# Patient Record
Sex: FEMALE | Race: WHITE | Employment: FULL TIME | URBAN - METROPOLITAN AREA
[De-identification: names, ages, dates, MRNs, and addresses within clinical notes are randomized per-mention and may not be internally consistent; named-entity substitution may affect disease eponyms.]

---

## 2017-01-17 ENCOUNTER — OFFICE VISIT (OUTPATIENT)
Dept: FAMILY MEDICINE | Facility: CLINIC | Age: 27
End: 2017-01-17
Payer: COMMERCIAL

## 2017-01-17 VITALS
DIASTOLIC BLOOD PRESSURE: 70 MMHG | BODY MASS INDEX: 27.82 KG/M2 | HEART RATE: 69 BPM | SYSTOLIC BLOOD PRESSURE: 115 MMHG | HEIGHT: 63 IN | TEMPERATURE: 97.6 F | OXYGEN SATURATION: 98 % | WEIGHT: 157 LBS

## 2017-01-17 DIAGNOSIS — F41.9 ANXIETY: Primary | ICD-10-CM

## 2017-01-17 PROCEDURE — 99203 OFFICE O/P NEW LOW 30 MIN: CPT | Performed by: PHYSICIAN ASSISTANT

## 2017-01-17 RX ORDER — CITALOPRAM HYDROBROMIDE 40 MG/1
40 TABLET ORAL
COMMUNITY
Start: 2016-12-06 | End: 2017-06-21

## 2017-01-17 RX ORDER — CITALOPRAM HYDROBROMIDE 20 MG/1
20 TABLET ORAL DAILY
Qty: 90 TABLET | Refills: 1 | Status: SHIPPED | OUTPATIENT
Start: 2017-01-17 | End: 2017-06-21

## 2017-01-17 ASSESSMENT — ANXIETY QUESTIONNAIRES
7. FEELING AFRAID AS IF SOMETHING AWFUL MIGHT HAPPEN: NOT AT ALL
3. WORRYING TOO MUCH ABOUT DIFFERENT THINGS: NEARLY EVERY DAY
GAD7 TOTAL SCORE: 8
IF YOU CHECKED OFF ANY PROBLEMS ON THIS QUESTIONNAIRE, HOW DIFFICULT HAVE THESE PROBLEMS MADE IT FOR YOU TO DO YOUR WORK, TAKE CARE OF THINGS AT HOME, OR GET ALONG WITH OTHER PEOPLE: SOMEWHAT DIFFICULT
6. BECOMING EASILY ANNOYED OR IRRITABLE: MORE THAN HALF THE DAYS
5. BEING SO RESTLESS THAT IT IS HARD TO SIT STILL: NOT AT ALL
1. FEELING NERVOUS, ANXIOUS, OR ON EDGE: NEARLY EVERY DAY
2. NOT BEING ABLE TO STOP OR CONTROL WORRYING: NOT AT ALL

## 2017-01-17 ASSESSMENT — PATIENT HEALTH QUESTIONNAIRE - PHQ9: 5. POOR APPETITE OR OVEREATING: NOT AT ALL

## 2017-01-17 NOTE — Clinical Note
Please abstract the following data from this visit with this patient into the appropriate field in Epic:  Pap smear done on this date: 5-, results were normal.

## 2017-01-17 NOTE — MR AVS SNAPSHOT
After Visit Summary   1/17/2017    Charis Kruger    MRN: [id number]           Patient Information     Date Of Birth          1990        Visit Information        Provider Department      1/17/2017 5:40 PM Brenda Larry PA-C [facility]        Today's Diagnoses     Anxiety    -  1       Care Instructions    Recheck 6 months, sooner if needed  Schedule with therapy        Follow-ups after your visit        Additional Services     MENTAL HEALTH REFERRAL       Your provider has referred you to: [facility]: [facility] - Counseling (Individual/Couples/Family) - [facility] [phone]   [email or web address]/   *Patient will be contacted by [facility] scheduling partner[facility]), to schedule an appointment.  Patients may also call [facility] to schedule.    All scheduling is subject to the client's specific insurance plan & benefits, provider/location availability, and provider clinical specialities.  Please arrive 15 minutes early for your first appointment and bring your completed paperwork.    Please be aware that coverage of these services is subject to the terms and limitations of your health insurance plan.  Call member services at your health plan with any benefit or coverage questions.                  Who to contact     If you have questions or need follow up information about today's clinic visit or your schedule please contact [facility] directly at [phone].  Normal or non-critical lab and imaging results will be communicated to you by MyChart, letter or phone within 4 business days after the clinic has received the results. If you do not hear from us within 7 days, please contact the clinic through MyChart or phone. If you have a critical or abnormal lab result, we will notify you by phone as soon as possible.  Submit refill requests through  "Tracee or call your pharmacy and they will forward the refill request to us. Please allow 3 business days for your refill to be completed.          Additional Information About Your Visit        MyChart Information     NCRhart lets you send messages to your doctor, view your test results, renew your prescriptions, schedule appointments and more. To sign up, go to [email or web address] . Click on \"Log in\" on the left side of the screen, which will take you to the Welcome page. Then click on \"Sign up Now\" on the right side of the page.     You will be asked to enter the access code listed below, as well as some personal information. Please follow the directions to create your username and password.     Your access code is: [email or web address]  Expires: 2017  6:18 PM     Your access code will  in 90 days. If you need help or a new code, please call your [place] clinic or [phone].        Care EveryWhere ID     This is your Care EveryWhere ID. This could be used by other organizations to access your [place] medical records  [id number]        Your Vitals Were     Pulse Temperature Height BMI (Body Mass Index) Pulse Oximetry       69 97.6  F (36.4  C) (Oral) 5' 3\" (1.6 m) 27.82 kg/m2 98%        Blood Pressure from Last 3 Encounters:   17 115/70    Weight from Last 3 Encounters:   17 157 lb (71.215 kg)              We Performed the Following     MENTAL HEALTH REFERRAL          Today's Medication Changes          These changes are accurate as of: 17  6:18 PM.  If you have any questions, ask your nurse or doctor.               These medicines have changed or have updated prescriptions.        Dose/Directions    * celeXA 40 MG tablet   This may have changed:  Another medication with the same name was added. Make sure you understand how and when to take each.   Generic drug:  citalopram   Changed by:  Brenda Larry PA-C        Dose:  40 mg   Take 40 mg by mouth   Refills:  0       * " citalopram 20 MG tablet   Commonly known as:  celeXA   This may have changed:  You were already taking a medication with the same name, and this prescription was added. Make sure you understand how and when to take each.   Used for:  Anxiety   Changed by:  Brenda Larry PA-C        Dose:  20 mg   Take 1 tablet (20 mg) by mouth daily Note decrease in dose   Quantity:  90 tablet   Refills:  1       * Notice:  This list has 2 medication(s) that are the same as other medications prescribed for you. Read the directions carefully, and ask your doctor or other care provider to review them with you.      Stop taking these medicines if you haven't already. Please contact your care team if you have questions.     CITALOPRAM & DIET MANAGE PROD PO   Stopped by:  Brenda Larry PA-C                Where to get your medicines      These medications were sent to [facility] [id number] [place] [address] AT [address] [address]     Phone:  [phone]    - citalopram 20 MG tablet             Primary Care Provider    None Specified       No primary provider on file.        Thank you!     Thank you for choosing [facility]  for your care. Our goal is always to provide you with excellent care. Hearing back from our patients is one way we can continue to improve our services. Please take a few minutes to complete the written survey that you may receive in the mail after your visit with us. Thank you!             Your Updated Medication List - Protect others around you: Learn how to safely use, store and throw away your medicines at www.disposemymeds.org.          This list is accurate as of: 1/17/17  6:18 PM.  Always use your most recent med list.                   Brand Name Dispense Instructions for use    * celeXA 40 MG tablet   Generic drug:  citalopram      Take 40 mg by mouth       * citalopram 20 MG tablet     celeXA    90 tablet    Take 1 tablet (20 mg) by mouth daily Note decrease in dose       * Notice:  This list has 2 medication(s) that are the same as other medications prescribed for you. Read the directions carefully, and ask your doctor or other care provider to review them with you.

## 2017-01-17 NOTE — PROGRESS NOTES
"  SUBJECTIVE:                                                    Charis Kruger is a 26 year old female who presents to clinic today for the following health issues:        Medication Followup of Citalopram 40 mg    Taking Medication as prescribed: yes    Side Effects:  None    Medication Helping Symptoms:  yes     Increased to 40 a few months ago, feels like it is too much feels \"numb\".    Has been on celexa for years. Would like to try weaning off.  No depression. No insomnia.    Has never done therapy.   Normal pap on 5-6-14. Will abstract. Through [facility].   Denies suicidal or homicidal thoughts.  Patient instructed to go to the emergency room or call 911 if these occur.    .  Not currently using birth control.  Not preventing pregnancy.      Working currently.  Works in [employer].     No previous pregnancies.           Problem list and histories reviewed & adjusted, as indicated.  Additional history: as documented    Patient Active Problem List   Diagnosis     Anxiety     Past Surgical History   Procedure Laterality Date     Back surgery  2008     tailbone surgery       Social History   Substance Use Topics     Smoking status: Never Smoker      Smokeless tobacco: Not on file     Alcohol Use: Yes      Comment: socially     No family history on file.      Current Outpatient Prescriptions   Medication Sig Dispense Refill     citalopram (CELEXA) 40 MG tablet Take 40 mg by mouth       citalopram (CELEXA) 20 MG tablet Take 1 tablet (20 mg) by mouth daily Note decrease in dose 90 tablet 1     No Known Allergies    ROS:  Constitutional, HEENT, cardiovascular, pulmonary, gi and gu systems are negative, except as otherwise noted.    OBJECTIVE:                                                    /70 mmHg  Pulse 69  Temp(Src) 97.6  F (36.4  C) (Oral)  Ht 5' 3\" (1.6 m)  Wt 157 lb (71.215 kg)  BMI 27.82 kg/m2  SpO2 98%  Body mass index is 27.82 kg/(m^2).  GENERAL: healthy, alert and no distress  RESP: lungs " clear to auscultation - no rales, rhonchi or wheezes  CV: regular rate and rhythm, normal S1 S2, no S3 or S4, no murmur, click or rub, no peripheral edema and peripheral pulses strong  MS: no gross musculoskeletal defects noted, no edema  NEURO: Normal strength and tone, mentation intact and speech normal  PSYCH: mentation appears normal, affect normal/bright         ASSESSMENT/PLAN:                                                    ASSESSMENT / PLAN:  (F41.9) Anxiety  (primary encounter diagnosis)  Comment:  Doing well but having some side effects, will decrease dose from 40 to 20 mg. Also would like to add therapy which would be good. Consider 30 mg dose in the future also if needed.   Plan: MENTAL HEALTH REFERRAL, citalopram (CELEXA) 20         MG tablet          See below    Patient Instructions   Recheck 6 months, sooner if needed  Schedule with therapy          Brenda Larry PA-C  [facility]

## 2017-01-18 ASSESSMENT — ANXIETY QUESTIONNAIRES: GAD7 TOTAL SCORE: 8

## 2017-02-28 ENCOUNTER — OFFICE VISIT (OUTPATIENT)
Dept: PSYCHOLOGY | Facility: CLINIC | Age: 27
End: 2017-02-28
Attending: PHYSICIAN ASSISTANT
Payer: COMMERCIAL

## 2017-02-28 DIAGNOSIS — F43.23 ADJUSTMENT DISORDER WITH MIXED ANXIETY AND DEPRESSED MOOD: Primary | ICD-10-CM

## 2017-02-28 PROCEDURE — 90791 PSYCH DIAGNOSTIC EVALUATION: CPT | Performed by: MARRIAGE & FAMILY THERAPIST

## 2017-02-28 ASSESSMENT — ANXIETY QUESTIONNAIRES
2. NOT BEING ABLE TO STOP OR CONTROL WORRYING: SEVERAL DAYS
1. FEELING NERVOUS, ANXIOUS, OR ON EDGE: NEARLY EVERY DAY
5. BEING SO RESTLESS THAT IT IS HARD TO SIT STILL: SEVERAL DAYS
GAD7 TOTAL SCORE: 12
7. FEELING AFRAID AS IF SOMETHING AWFUL MIGHT HAPPEN: SEVERAL DAYS
6. BECOMING EASILY ANNOYED OR IRRITABLE: MORE THAN HALF THE DAYS
3. WORRYING TOO MUCH ABOUT DIFFERENT THINGS: MORE THAN HALF THE DAYS

## 2017-02-28 ASSESSMENT — PATIENT HEALTH QUESTIONNAIRE - PHQ9: 5. POOR APPETITE OR OVEREATING: MORE THAN HALF THE DAYS

## 2017-02-28 NOTE — MR AVS SNAPSHOT
"                  MRN:[id number]                      After Visit Summary   2017    Charis Kruger    MRN: [id number]           Visit Information        Provider Department      2017 11:00 AM Ashley Torrez, [facility]      Your next 10 appointments already scheduled     Apr 15, 2017 11:00 AM CDT   Return Visit with Ashley Torrez [facility])    [address] [facility] [address] [phone]            2017 10:00 AM CDT   Return Visit with Ashley Shookman [facility]    [address] [facility] [address] [phone]              MyChart Information     Qgivhart lets you send messages to your doctor, view your test results, renew your prescriptions, schedule appointments and more. To sign up, go to [email or web address] . Click on \"Log in\" on the left side of the screen, which will take you to the Welcome page. Then click on \"Sign up Now\" on the right side of the page.     You will be asked to enter the access code listed below, as well as some personal information. Please follow the directions to create your username and password.     Your access code is: [email or web address]  Expires: 2017  6:18 PM     Your access code will  in 90 days. If you need help or a new code, please call your [place] clinic or [phone].        Care EveryWhere ID     This is your Care EveryWhere ID. This could be used by other organizations to access your [place] medical records  [id number]        "

## 2017-03-01 PROBLEM — F43.23 ADJUSTMENT DISORDER WITH MIXED ANXIETY AND DEPRESSED MOOD: Status: ACTIVE | Noted: 2017-03-01

## 2017-03-01 ASSESSMENT — ANXIETY QUESTIONNAIRES: GAD7 TOTAL SCORE: 12

## 2017-03-01 ASSESSMENT — PATIENT HEALTH QUESTIONNAIRE - PHQ9: SUM OF ALL RESPONSES TO PHQ QUESTIONS 1-9: 10

## 2017-03-01 NOTE — PROGRESS NOTES
"                                                                                                                                                                        Adult Intake Structured Interview  Standard Diagnostic Assessment      CLIENT'S NAME: Charis Kruger  MRN:   [id number]  :   1990  ACCT. NUMBER: [id number]  DATE OF SERVICE: 17      Identifying Information:  Client is a 26 year old, ,  female. Client was referred for counseling by Brenda Larry PA-C at [facility]. 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 Client attended the session alone.       Client's Statement of Presenting Concern:  Client reports the reason for seeking therapy at this time as anxiety and feeling \"tired of having to depend on medication.\"  Client reports several stressors within the year that are continuing to affect her emotionally.  She notices her heart rate increases and she feels tense at work with the various tasks in the stressful environment.  She reports feeling down, embarrassed, and guilty about receiving a DWI in 2016.  Client stated that her symptoms have resulted in the following functional impairments: self-care and work / vocational responsibilities      History of Presenting Concern:  Client reports that anxiety symptoms started while in college and she began using Celexa at that time which was helpful.  She believes the anxiety in college was related to adjustment to living away from parents and the work load.  Symptoms increased in the past eight months.   Client has attempted to resolve these concerns in the past through medication. Client reports that other professional(s) are involved in providing support / services: PCP.      Social History:  Client reported she grew up in [place], [place].   She is the youngest of two children.   This is an intact family and parents remain . Client reported that her childhood was \"fun, " "calm, and active.\"  She describes parents as quite supportive.  She and  are currently living with her parents in order to save money to buy a house.  This living arrangement is going well and she reports no concerns.     Client reported a history of one marriage.  Client was in relationship with her current  for eight years before they  in September 2016.  Client reported having no children. Client identified some stable and meaningful social connections. Client reported that she has been involved with the legal system related to a DWI in July 2016.  Client is half-way through completing requirements including Sentenced to Serve, ignition interlock, and license plate modifications. Client's highest education level was college graduate. Client did not identify any learning problems. There are no ethnic, cultural or Protestant factors that may be relevant for therapy. Client identified her preferred language to be English. Client reported she does not need the assistance of an  or other support involved in therapy. Modifications will not be used to assist communication in therapy. Client did not serve in the .     Client reports family history is not on file.    Mental Health History:  Client reported the following biological family members or relatives with mental health issues: mother experiences anxiety and depression; father experiences anxiety; cousin experiences schizophrenia.  Client previously received the following mental health diagnosis: Anxiety.  Client has received the following mental health services in the past: medication(s) from physician / PCP.  Hospitalizations: None.  Client is currently receiving the following services: medication(s) from physician / PCP.      Chemical Health History:  Client reported the following biological family members or relatives with chemical health issues: maternal family history. Client has not received chemical dependency " treatment in the past. Client is not currently receiving any chemical dependency treatment. Client reported the following problems as a result of drinking: DWI in July 2016.    Client Reports:  Client reports using alcohol 3 times per week and has 1-2 beers at a time. Client first started drinking at age 23.  Client denies using tobacco.  Client denies using marijuana.  Client reports drinking caffeine twice per day and has 1 cup at a time.    Client denies using street drugs.  Client denies the non-medical use of prescription or over the counter drugs.    CAGE: None of the patient's responses to the CAGE screening were positive / Negative CAGE score   Based on the negative Cage-Aid score and clinical interview there  are not indications of drug or alcohol abuse.    Discussed the general effects of drugs and alcohol on health and well-being.     Significant Losses / Trauma / Abuse / Neglect Issues:  There are no indications or report of: significant losses, trauma, abuse or neglect.    Issues of possible neglect are not present.      Medical Issues:  Client has had a physical exam to rule out medical causes for current symptoms. Date of last physical exam was within the past year. Client was encouraged to follow up with PCP if symptoms were to develop. The client has a [facility] Provider, who is named Brenda Larry PA-C at [facility]. The client reports not having a psychiatrist. Client reports no current medical concerns. The client denies the presence of chronic or episodic pain. Client reports concern about her weight and this has been longstanding.  Client believes she is overweight.    Client reports current meds as:   Current Outpatient Prescriptions   Medication Sig     citalopram (CELEXA) 40 MG tablet Take 40 mg by mouth     citalopram (CELEXA) 20 MG tablet Take 1 tablet (20 mg) by mouth daily Note decrease in dose     No current facility-administered medications for this visit.         Client Allergies:  No Known Allergies  no known allergies to medications    Medical History:  Past Medical History   Diagnosis Date     Anxiety          Medication Adherence:  Client reports taking prescribed medications as prescribed.    Mental Status Assessment:  Appearance:   Appropriate   Eye Contact:   Good   Psychomotor Behavior: Normal   Attitude:   Cooperative   Orientation:   All  Speech   Rate / Production: Normal    Volume:  Normal   Mood:    Normal  Affect:    Worrisome   Thought Content:  Clear   Thought Form:  Coherent  Logical   Insight:    Good       Review of Symptoms:  Depression: Sleep Guilt Energy Concentration Appetite Hopeless Helpless Worthless Ruminations Irritability  Carli:  No symptoms  Psychosis: No symptoms  Anxiety: Worries Nervousness Difficulty relaxing, Irritability, Feeling on-edge  Panic:  Shortness of Breath  Post Traumatic Stress Disorder: No symptoms  Obsessive Compulsive Disorder: No symptoms  Eating Disorder: No symptoms  Oppositional Defiant Disorder: No symptoms  ADD / ADHD: No symptoms  Conduct Disorder: No symptoms        Safety Issues and Plan for Safety and Risk Management:  Client denies a history of suicidal ideation, suicide attempts, self-injurious behavior, homicidal ideation, homicidal behavior and and other safety concerns  Client denies current fears or concerns for personal safety.  Client denies current or recent suicidal ideation or behaviors.  Client denies current or recent homicidal ideation or behaviors.  Client denies current or recent self injurious behavior or ideation.  Client denies other safety concerns.  Client reports there are no firearms in the house.  A safety and risk management plan has not been developed at this time, however client was given the after-hours number / 911 should there be a change in any of these risk factors.    Client's Strengths and Limitations:  Client identified the following strengths or resources that will help her  succeed in counseling: commitment to health and well being, family support and Oriental orthodox community. Client identified the following supports: , parents, family, God, and Oriental orthodox group. Things that may interfere with the clients success in counseling include:financial stress and taking time off work.        Diagnostic Criteria:  A. The development of emotional or behavioral symptoms in response to an identifiable stressor(s) occurring within 3 months of the onset of the stressor(s)  B. These symptoms or behaviors are clinically significant, as evidenced by one or both of the following:       - Marked distress that is out of proportion to the severity/intensity of the stressor (with consideration for external context & culture)       - Significant impairment in social, occupational, or other important areas of functioning  C. The stress-related disturbance does not meet criteria for another disorder & is not not an exacerbation of another mental disorder  D. The symptoms do not represent normal bereavement  E. Once the stressor or its consequences have terminated, the symptoms do not persist for more than an additional 6 months       * Adjustment Disorder with Mixed Anxiety and Depressed Mood: The predominant manifestation is a combination of depression and anxiety      Functional Status:  Client's symptoms are causing reduced functional status in the following areas: Activities of Daily Living - appetite changes  Occupational / Vocational - work stress      DSM5 Diagnoses: (Sustained by DSM5 Criteria Listed Above)  Diagnoses: Adjustment Disorders  309.28 (F43.23) With mixed anxiety and depressed mood  Psychosocial & Contextual Factors: recently , DWI consequences, financial stress  WHODAS 2.0 (12 item)            This questionnaire asks about difficulties due to health conditions. Health conditions include disease or illnesses, other health problems that may be short or long lasting,  injuries, mental  health or emotional problems, and problems with alcohol or drugs.                     Think back over the past 30 days and answer these questions, thinking about how much difficulty you had doing the following activities. For each question, please [place] only  one response.    S1 Standing for long periods such as 30 minutes? None =         1   S2 Taking care of household responsibilities? Mild =           2   S3 Learning a new task, for example, learning how to get to a new place? None =         1   S4 How much of a problem do you have joining community activities (for example, festivals, Protestant or other activities) in the same way as anyone else can? Moderate =   3   S5 How much have you been emotionally affected by your health problems? Moderate =   3     In the past 30 days, how much difficulty did you have in:   S6 Concentrating on doing something for ten minutes? Mild =           2   S7 Walking a long distance such as a kilometer (or equivalent)? None =         1   S8 Washing your whole body? None =         1   S9 Getting dressed? None =         1   S10 Dealing with people you do not know? None =         1   S11 Maintaining a friendship? Mild =           2   S12 Your day to day work? Mild =           2     H1 Overall, in the past 30 days, how many days were these difficulties present? Record number of days   30   H2 In the past 30 days, for how many days were you totally unable to carry out your usual activities or work because of any health condition? Record number of days    0   H3 In the past 30 days, not counting the days that you were totally unable, for how many days did you cut back or reduce your usual activities or work because of any health condition? Record number of days   5-10     Attendance Agreement:  Client has signed Attendance Agreement:Yes      Preliminary Treatment Plan:  The client reports no currently identified Protestant, ethnic or cultural issues relevant to therapy.      services are not indicated.    Modifications to assist communication are not indicated.    The concerns identified by the client will be addressed in therapy.    Initial Treatment will focus on: Adjustment Difficulties related to: DWI, marriage, financial strain, work stress.    As a preliminary treatment goal, client will develop coping/problem-solving skills to facilitate more adaptive adjustment.    The focus of initial interventions will be to facilitate appropriate expression of feelings, increase ability to function adaptively, increase coping skills and provide homework to reinforce skill development.    The client is receiving treatment / structured support from the following professional(s) / service and treatment. Collaboration will be initiated with: primary care physician.    Referral to another professional/service is not indicated at this time..    A Release of Information is not needed at this time.    Report to child / adult protection services was NA.    Client will have access to their [facility] medical record.    WILLIAM Michael  March 1, 2017

## 2017-05-08 ENCOUNTER — FCC EXTENDED DOCUMENTATION (OUTPATIENT)
Dept: PSYCHOLOGY | Facility: CLINIC | Age: 27
End: 2017-05-08

## 2017-05-08 NOTE — PROGRESS NOTES
"                      Discharge Summary  Single Session    Client Name: Charis Kruger MRN#: [id number] YOB: 1990      Intake / Discharge Date: Intake: 2/28/17 / Discharge: 5/8/17      DSM5 Diagnoses: (Sustained by DSM5 Criteria Listed Above)  Diagnoses: Adjustment Disorders 309.28 (F43.23) With mixed anxiety and depressed mood  Psychosocial & Contextual Factors: recently , DWI consequences, financial stress  WHODAS 2.0 (12 item) Score: 20          Presenting Concern:  At time of intake, client presented with the following concerns: anxiety and feeling \"tired of having to depend on medication.\" Client reports several stressors within the year that are continuing to affect her emotionally. She notices her heart rate increases and she feels tense at work with the various tasks in the stressful environment. She reports feeling down, embarrassed, and guilty about receiving a DWI in July 2016.      Reason for Discharge:  Client did not return      Disposition at Time of Last Encounter:   Comments:   Stable, slightly worrisome     Risk Management:   Client denies a history of suicidal ideation, suicide attempts, self-injurious behavior, homicidal ideation, homicidal behavior and and other safety concerns  A safety and risk management plan has not been developed at this time, however client was given the after-hours number / 911 should there be a change in any of these risk factors.      Referred To:  Client is welcome to return to [facility] for therapy in the future and can contact [facility] Intake to schedule ([phone]).          Ashley Torrez, LMFT   5/8/2017  "

## 2017-06-21 ENCOUNTER — OFFICE VISIT (OUTPATIENT)
Dept: URGENT CARE | Facility: URGENT CARE | Age: 27
End: 2017-06-21
Payer: COMMERCIAL

## 2017-06-21 VITALS
WEIGHT: 155 LBS | DIASTOLIC BLOOD PRESSURE: 77 MMHG | OXYGEN SATURATION: 99 % | HEART RATE: 82 BPM | TEMPERATURE: 99.1 F | SYSTOLIC BLOOD PRESSURE: 120 MMHG | BODY MASS INDEX: 27.46 KG/M2 | RESPIRATION RATE: 15 BRPM

## 2017-06-21 DIAGNOSIS — H60.501 ACUTE OTITIS EXTERNA OF RIGHT EAR, UNSPECIFIED TYPE: Primary | ICD-10-CM

## 2017-06-21 PROCEDURE — 99213 OFFICE O/P EST LOW 20 MIN: CPT | Performed by: FAMILY MEDICINE

## 2017-06-21 RX ORDER — CIPROFLOXACIN AND DEXAMETHASONE 3; 1 MG/ML; MG/ML
4 SUSPENSION/ DROPS AURICULAR (OTIC) 2 TIMES DAILY
Qty: 2.8 ML | Refills: 0 | Status: SHIPPED | OUTPATIENT
Start: 2017-06-21 | End: 2017-06-28

## 2017-06-21 ASSESSMENT — PAIN SCALES - GENERAL: PAINLEVEL: EXTREME PAIN (8)

## 2017-06-21 NOTE — MR AVS SNAPSHOT
"              After Visit Summary   2017    Charis Kruger    MRN: [id number]           Patient Information     Date Of Birth          1990        Visit Information        Provider Department      2017 6:40 PM Jerica Betancourt MD [facility]        Today's Diagnoses     Acute otitis externa of right ear, unspecified type    -  1       Follow-ups after your visit        Who to contact     If you have questions or need follow up information about today's clinic visit or your schedule please contact [facility] directly at [phone].  Normal or non-critical lab and imaging results will be communicated to you by [employer], letter or phone within 4 business days after the clinic has received the results. If you do not hear from us within 7 days, please contact the clinic through [employer] or phone. If you have a critical or abnormal lab result, we will notify you by phone as soon as possible.  Submit refill requests through [employer] or call your pharmacy and they will forward the refill request to us. Please allow 3 business days for your refill to be completed.          Additional Information About Your Visit        MyChart Information     [employer] lets you send messages to your doctor, view your test results, renew your prescriptions, schedule appointments and more. To sign up, go to [email or web address] . Click on \"Log in\" on the left side of the screen, which will take you to the Welcome page. Then click on \"Sign up Now\" on the right side of the page.     You will be asked to enter the access code listed below, as well as some personal information. Please follow the directions to create your username and password.     Your access code is: [email or web address]  Expires: 2017  7:13 PM     Your access code will  in 90 days. If you need help or a new code, please call your [facility] or [phone].        Care EveryWhere ID     This is your Care EveryWhere " ID. This could be used by other organizations to access your [place] medical records  [id number]        Your Vitals Were     Pulse Temperature Respirations Pulse Oximetry Breastfeeding? BMI (Body Mass Index)    82 99.1  F (37.3  C) (Oral) 15 99% No 27.46 kg/m2       Blood Pressure from Last 3 Encounters:   06/21/17 120/77   01/17/17 115/70    Weight from Last 3 Encounters:   06/21/17 155 lb (70.3 kg)   01/17/17 157 lb (71.2 kg)              Today, you had the following     No orders found for display         Today's Medication Changes          These changes are accurate as of: 6/21/17  7:14 PM.  If you have any questions, ask your nurse or doctor.               Start taking these medicines.        Dose/Directions    ciprofloxacin-dexamethasone otic suspension   Commonly known as:  CIPRODEX   Used for:  Acute otitis externa of right ear, unspecified type   Started by:  Jerica Betancourt MD        Dose:  4 drop   Place 4 drops into the right ear 2 times daily for 7 days May prescribe ciprofloxacin eye drops instead to use in the ears   Quantity:  2.8 mL   Refills:  0            Where to get your medicines      These medications were sent to [facility] [id number] [place], [place] - [address]  [address] [address]     Phone:  [phone]     ciprofloxacin-dexamethasone otic suspension                Primary Care Provider    None Specified       No primary provider on file.        Equal Access to Services     DANIAL ROBERTSON : Ragini Lewis, patty redman, qamaryjo barillas. So [facility] [phone].    ATENCIÓN: Si habla español, tiene a hubbard disposición servicios gratuitos de asistencia lingüística. Estevan al [phone].    We comply with applicable federal civil rights laws and [place] laws. We do not discriminate on the basis of race, color, national origin, age, disability sex, sexual orientation or gender  identity.            Thank you!     [facility] [place]  [facility]. Our goal is always to provide you with excellent care. Hearing back from our patients is one way we can continue to improve our services. Please take a few minutes to complete the written survey that you may receive in the mail after your visit with us. Thank you!             Your Updated Medication List - Protect others around you: Learn how to safely use, store and throw away your medicines at www.disposemymeds.org.          This list is accurate as of: 6/21/17  7:14 PM.  Always use your most recent med list.                   Brand Name Dispense Instructions for use Diagnosis    ciprofloxacin-dexamethasone otic suspension    CIPRODEX    2.8 mL    Place 4 drops into the right ear 2 times daily for 7 days May prescribe ciprofloxacin eye drops instead to use in the ears    Acute otitis externa of right ear, unspecified type

## 2017-06-21 NOTE — PROGRESS NOTES
SUBJECTIVE:                                                    Charis Kruger is a 27 year old female who presents to clinic today for the following health issues:      RESPIRATORY SYMPTOMS      Duration: 1 day    Description  facial pain/pressure, ear pain right and headache    Severity: severe    Accompanying signs and symptoms: None    History (predisposing factors):  none    Precipitating or alleviating factors: None    Therapies tried and outcome:  none     No thoughts of harming self or others. Anxiety doing well.     Both ears have been itchy past month off and on.  This morning however the right ear felt swollen and hurts to touch and worsening throughout the day.  Patient not sure if something is draining out of it. A lot of pressure and plugging.  Some decreased hearing in the right ear.  Denies any possibility of pregnancy declined a pregnancy test    Other symptoms: no cough, colds, sinus congestion  Fever: No  Tried over the counter medications without relief  No fevers or chills chest pain or shortness of breath   No rash  Ill-contacts: none  Because of persistent and worsening symptoms came in to be seen    Problem list and histories reviewed & adjusted, as indicated.  Additional history: as documented    Problem list, Medication list, Allergies, and Medical/Social/Surgical histories reviewed in [facility] and updated as appropriate.    ROS:  Constitutional, HEENT, cardiovascular, pulmonary, gi and gu systems are negative, except as otherwise noted.    OBJECTIVE:                                                    /77  Pulse 82  Temp 99.1  F (37.3  C) (Oral)  Resp 15  Wt 155 lb (70.3 kg)  SpO2 99%  Breastfeeding? No  BMI 27.46 kg/m2  Body mass index is 27.46 kg/(m^2).  GENERAL: healthy, alert and no distress  EYES: pink palpebral conjunctiva, anicteric sclera, pupils equally reactive to light and accomodation, extraocular muscles intact full and equal.  ENT: midline nasal septum, no  nasal congestion   Left ear:no tragal tenderness, no mastoid tenderness normal tympaninc membrane   Right ear: positive  tragal tenderness, external auditory canal swollen about 30% no mastoid tenderness normal and but only partially visualized tympaninc membrane  because of external auditory canal swelling  NECK: no adenopathy, no asymmetry or  masses  RESP: lungs clear to auscultation - no rales, rhonchi or wheezes  CV: regular rate and rhythm, normal S1 S2, no S3 or S4, no murmur, click or rub, no peripheral edema and peripheral pulses strong  MS: no gross musculoskeletal defects noted, no edema  NEURO: Normal strength and tone, mentation intact and speech normal    Diagnostic Test Results:  No results found for this or any previous visit (from the past 24 hour(s)).     ASSESSMENT/PLAN:                                                        ICD-10-CM    1. Acute otitis externa of right ear, unspecified type H60.501 ciprofloxacin-dexamethasone (CIPRODEX) otic suspension       Prescribed with ciprodex   Recommend follow up with primary care provider if no relief in 3 days, sooner if worse  Adverse reactions of medications discussed.  Over the counter medications discussed.   Aware to come back in if with worsening symptoms or if no relief despite treatment plan  Patient voiced understanding and had no further questions.     MD Jerica Ponce MD  [facility]

## 2017-06-21 NOTE — NURSING NOTE
"Chief Complaint   Patient presents with     Otalgia     right ear pain        Initial /77  Pulse 82  Temp 99.1  F (37.3  C) (Oral)  Resp 15  Wt 155 lb (70.3 kg)  SpO2 99%  Breastfeeding? No  BMI 27.46 kg/m2 Estimated body mass index is 27.46 kg/(m^2) as calculated from the following:    Height as of 1/17/17: 5' 3\" (1.6 m).    Weight as of this encounter: 155 lb (70.3 kg).  Medication Reconciliation: complete   Kalin Church MA      "

## 2017-06-22 ENCOUNTER — OFFICE VISIT (OUTPATIENT)
Dept: URGENT CARE | Facility: URGENT CARE | Age: 27
End: 2017-06-22
Payer: COMMERCIAL

## 2017-06-22 VITALS
TEMPERATURE: 100.8 F | SYSTOLIC BLOOD PRESSURE: 103 MMHG | DIASTOLIC BLOOD PRESSURE: 69 MMHG | BODY MASS INDEX: 27.28 KG/M2 | WEIGHT: 154 LBS | HEART RATE: 110 BPM | OXYGEN SATURATION: 97 %

## 2017-06-22 DIAGNOSIS — H65.191 OTHER ACUTE NONSUPPURATIVE OTITIS MEDIA OF RIGHT EAR, RECURRENCE NOT SPECIFIED: Primary | ICD-10-CM

## 2017-06-22 DIAGNOSIS — R50.9 FEVER, UNSPECIFIED: ICD-10-CM

## 2017-06-22 DIAGNOSIS — H60.501 ACUTE OTITIS EXTERNA OF RIGHT EAR, UNSPECIFIED TYPE: ICD-10-CM

## 2017-06-22 PROCEDURE — 99213 OFFICE O/P EST LOW 20 MIN: CPT | Performed by: FAMILY MEDICINE

## 2017-06-22 NOTE — PROGRESS NOTES
SUBJECTIVE:                                                    Charis Kruger is a 27 year old female who presents to clinic today for the following health issues:      RESPIRATORY SYMPTOMS      Duration: fever started today    Description  nasal congestion, sore throat, facial pain/pressure, fever, ear pain right, headache, fatigue/malaise and myalgias    Severity: moderate    Accompanying signs and symptoms: None    History (predisposing factors):  When younger     Precipitating or alleviating factors: None    Therapies tried and outcome:  rest and fluids OTC NSAID     Yesterday was seen in clinic and patient had right otitis externa  Patient started the drops  However throughout the day started having fevers sore throat nasal congestion and headache and body aches.    Because of this came in to be seen    Because of persistent and worsening symptoms came in to be seen    Problem list and histories reviewed & adjusted, as indicated.  Additional history: as documented    Problem list, Medication list, Allergies, and Medical/Social/Surgical histories reviewed in [facility] and updated as appropriate.    ROS:  Constitutional, HEENT, cardiovascular, pulmonary, gi and gu systems are negative, except as otherwise noted.    OBJECTIVE:                                                    /69  Pulse 110  Temp 100.8  F (38.2  C)  Wt 154 lb (69.9 kg)  LMP 06/03/2017 (Exact Date)  SpO2 97%  BMI 27.28 kg/m2  Body mass index is 27.28 kg/(m^2).  GENERAL: healthy, alert and no distress  EYES: pink palpebral conjunctiva, anicteric sclera, pupils equally reactive to light and accomodation, extraocular muscles intact full and equal.  ENT: midline nasal septum, positive right maxillary sinus tenderness and nasal congestion   Left ear:no tragal tenderness, no mastoid tenderness normal tympaninc membrane   Right ear: positive right tragal tenderness, no mastoid tenderness erythematous and bulging tympaninc membrane.  External auditory canal still swollen and erythematous with some debris but still very much patent for drops.   NECK: no adenopathy, no asymmetry or  masses  RESP: lungs clear to auscultation - no rales, rhonchi or wheezes  CV: regular rate and rhythm, normal S1 S2, no S3 or S4, no murmur, click or rub, no peripheral edema and peripheral pulses strong  ABDOMEN: soft, nontender, no hepatosplenomegaly, no masses and bowel sounds normal  MS: no gross musculoskeletal defects noted, no edema  NEURO: Normal strength and tone, mentation intact and speech normal    Diagnostic Test Results:  No results found for this or any previous visit (from the past 24 hour(s)).     ASSESSMENT/PLAN:                                                        ICD-10-CM    1. Other acute nonsuppurative otitis media of right ear, recurrence not specified H65.191 amoxicillin-clavulanate (AUGMENTIN) 875-125 MG per tablet     OTOLARYNGOLOGY REFERRAL   2. Acute otitis externa of right ear, unspecified type H60.501    3. Fever, unspecified R50.9        Prescribed with augmentin  Side effects discussed warned about GI side effects and risk of cdiff.  Continue ear drops   Recommend follow up with primary care provider or ENT if no relief, sooner if worse  Needs ear recheck with primary care provider in 2-4 weeks  Adverse reactions of medications discussed.  Over the counter medications discussed.   Aware to come back in if with worsening symptoms or if no relief despite treatment plan  Patient voiced understanding and had no further questions.     MD Jerica Ponce MD  [facility]

## 2017-06-22 NOTE — MR AVS SNAPSHOT
After Visit Summary   6/22/2017    Charis Kruger    MRN: [id number]           Patient Information     Date Of Birth          1990        Visit Information        Provider Department      6/22/2017 5:00 PM Jerica Betancourt MD [facility]        Today's Diagnoses     Other acute nonsuppurative otitis media of right ear, recurrence not specified    -  1    Acute otitis externa of right ear, unspecified type        Fever, unspecified           Follow-ups after your visit        Additional Services     OTOLARYNGOLOGY REFERRAL       Your provider has referred you to: [facility]: [facility] [phone]   [email or web address]    Please be aware that coverage of these services is subject to the terms and limitations of your health insurance plan.  Call member services at your health plan with any benefit or coverage questions.      Please bring the following with you to your appointment:    (1) Any X-Rays, CTs or MRIs which have been performed.  Contact the facility where they were done to arrange for  prior to your scheduled appointment.   (2) List of current medications  (3) This referral request   (4) Any documents/labs given to you for this referral                  Who to contact     If you have questions or need follow up information about today's clinic visit or your schedule please contact [facility] directly at [phone].  Normal or non-critical lab and imaging results will be communicated to you by MyChart, letter or phone within 4 business days after the clinic has received the results. If you do not hear from us within 7 days, please contact the clinic through MyChart or phone. If you have a critical or abnormal lab result, we will notify you by phone as soon as possible.  Submit refill requests through [employer] or call your pharmacy and they will forward the refill request to us. Please allow 3 business  "days for your refill to be completed.          Additional Information About Your Visit        MyChart Information     [employer] lets you send messages to your doctor, view your test results, renew your prescriptions, schedule appointments and more. To sign up, go to [email or web address] . Click on \"Log in\" on the left side of the screen, which will take you to the Welcome page. Then click on \"Sign up Now\" on the right side of the page.     You will be asked to enter the access code listed below, as well as some personal information. Please follow the directions to create your username and password.     Your access code is: [email or web address]  Expires: 2017  7:13 PM     Your access code will  in 90 days. If you need help or a new code, please call your [place] clinic or [phone].        Care EveryWhere ID     This is your Care EveryWhere ID. This could be used by other organizations to access your [place] medical records  [id number]        Your Vitals Were     Pulse Temperature Last Period Pulse Oximetry BMI (Body Mass Index)       110 100.8  F (38.2  C) 2017 (Exact Date) 97% 27.28 kg/m2        Blood Pressure from Last 3 Encounters:   17 103/69   17 120/77   17 115/70    Weight from Last 3 Encounters:   17 154 lb (69.9 kg)   17 155 lb (70.3 kg)   17 157 lb (71.2 kg)              We Performed the Following     OTOLARYNGOLOGY REFERRAL          Today's Medication Changes          These changes are accurate as of: 17  5:01 PM.  If you have any questions, ask your nurse or doctor.               Start taking these medicines.        Dose/Directions    amoxicillin-clavulanate 875-125 MG per tablet   Commonly known as:  AUGMENTIN   Used for:  Other acute nonsuppurative otitis media of right ear, recurrence not specified   Started by:  Jerica Betancourt MD        Dose:  1 tablet   Take 1 tablet by mouth 2 times daily for 10 days   Quantity:  20 tablet "   Refills:  0            Where to get your medicines      These medications were sent to [facility] [address] [place] [facility] [place], [place] - [address] [address]     Phone:  [phone]     amoxicillin-clavulanate 875-125 MG per tablet                Primary Care Provider    None Specified       No primary provider on file.        Equal Access to Services     [facility]: Hadii aad ku hadasho Soomaali, waaxda luqadaha, qaybta kaalmada adeegyada, waxay arlettein hayaan adeeg claudinejoselitonaveed tello . So [facility] [phone].    ATENCIÓN: Si habla español, tiene a hubbard disposición servicios gratuitos de asistencia lingüística. Llame al [phone].    We comply with applicable federal civil rights laws and [place] laws. We do not discriminate on the basis of race, color, national origin, age, disability sex, sexual orientation or gender identity.            Thank you!     Thank you for choosing [facility]  for your care. Our goal is always to provide you with excellent care. Hearing back from our patients is one way we can continue to improve our services. Please take a few minutes to complete the written survey that you may receive in the mail after your visit with us. Thank you!             Your Updated Medication List - Protect others around you: Learn how to safely use, store and throw away your medicines at www.disposemymeds.org.          This list is accurate as of: 6/22/17  5:01 PM.  Always use your most recent med list.                   Brand Name Dispense Instructions for use Diagnosis    amoxicillin-clavulanate 875-125 MG per tablet    AUGMENTIN    20 tablet    Take 1 tablet by mouth 2 times daily for 10 days    Other acute nonsuppurative otitis media of right ear, recurrence not specified       ciprofloxacin-dexamethasone otic suspension    CIPRODEX    2.8 mL    Place 4 drops into the right ear 2 times daily for 7 days May prescribe ciprofloxacin eye drops instead to use  in the ears    Acute otitis externa of right ear, unspecified type       SPIRONOLACTONE PO

## 2017-06-22 NOTE — NURSING NOTE
"Chief Complaint   Patient presents with     Fever       Initial /69  Pulse 131  Temp 100.8  F (38.2  C)  Wt 154 lb (69.9 kg)  LMP 06/03/2017 (Exact Date)  SpO2 97%  BMI 27.28 kg/m2 Estimated body mass index is 27.28 kg/(m^2) as calculated from the following:    Height as of 1/17/17: 5' 3\" (1.6 m).    Weight as of this encounter: 154 lb (69.9 kg).  Medication Reconciliation: complete  "

## 2017-12-28 NOTE — PATIENT INSTRUCTIONS
Birth control instructions:    1) Take at the same time every day.    2) The more times you miss a pill, the less effective the pill with be for contraception and managing periods.    3)  The pill will become effective at preventing pregnancy after 7 days of use.  Use a back up condom until then.    4)  Birth control does not protect you against sexually transmitted infections (STDS or STIS) so always use protection if not in a monogamous relationship.    5) Risks/side effects can include blood clots, high blood pressure, headaches, nausea (try taking with food if this occurs).  Do not smoke with this as this increases your blood clot risk.  If you become pregnant, stop taking the pill right away.    6) The first 3 months of being on birth control, you may have irregular periods or spotting.  Typically this will even out after your body adjusts.  If you have any questions or concerns, let me know.       '  Preventive Health Recommendations  Female Ages 18 to 25     Yearly exam:     See your health care provider every year in order to  o Review health changes.   o Discuss preventive care.    o Review your medicines if your doctor has prescribed any.      You should be tested each year for STDs (sexually transmitted diseases).       After age 20, talk to your provider about how often you should have cholesterol testing.      Starting at age 21, get a Pap test every three years. If you have an abnormal result, your doctor may have you test more often.      If you are at risk for diabetes, you should have a diabetes test (fasting glucose).     Shots:     Get a flu shot each year.     Get a tetanus shot every 10 years.     Consider getting the shot (vaccine) that prevents cervical cancer (Gardasil).    Nutrition:     Eat at least 5 servings of fruits and vegetables each day.    Eat whole-grain bread, whole-wheat pasta and brown rice instead of white grains and rice.    Talk to your provider about Calcium and Vitamin D.      Lifestyle    Exercise at least 150 minutes a week each week (30 minutes a day, 5 days a week). This will help you control your weight and prevent disease.    Limit alcohol to one drink per day.    No smoking.     Wear sunscreen to prevent skin cancer.    See your dentist every six months for an exam and cleaning.

## 2017-12-28 NOTE — PROGRESS NOTES
SUBJECTIVE:   CC: Charis Kruger is an 27 year old woman who presents for preventive health visit.     Healthy Habits:    Answers for HPI/ROS submitted by the patient on 1/11/2018   Annual Exam:  Getting at least 3 servings of Calcium per day:: Yes  Bi-annual eye exam:: NO  Dental care twice a year:: Yes  Sleep apnea or symptoms of sleep apnea:: None  Diet:: Regular (no restrictions)  Frequency of exercise:: 6-7 days/week  Taking medications regularly:: Yes  Medication side effects:: Not applicable  Additional concerns today:: YES  PHQ-2 Score: 2  Duration of exercise:: 30-45 minutes    PAP is due per pt haven't had one since 2012?    Periods-normal.  Not using contraception.  Starting to think about having kids. Would like to try birth control for awhile before though. No h/o blood clots or heart issues.   Fasting labs?  Will get today  FH of breast or colon cancer? Great grandma      Acne-was on spironolactone.  Made her urinate too much. Saw derm for this.  Will continue to follow with them, maddison may help.     Mood has have good. Denies suicidal or homicidal thoughts.  Patient instructed to go to the emergency room or call 911 if these occur.              Today's PHQ-2 Score:   PHQ-2 ( 1999 Pfizer) 1/11/2018   Q1: Little interest or pleasure in doing things 1   Q2: Feeling down, depressed or hopeless 1   PHQ-2 Score 2   Q1: Little interest or pleasure in doing things Several days   Q2: Feeling down, depressed or hopeless Several days   PHQ-2 Score 2         Abuse: Current or Past(Physical, Sexual or Emotional)- No  Do you feel safe in your environment - Yes  Social History   Substance Use Topics     Smoking status: Never Smoker     Smokeless tobacco: Never Used     Alcohol use Yes      Comment: socially     If you drink alcohol do you typically have >3 drinks per day or >7 drinks per week? No                     Reviewed orders with patient.  Reviewed health maintenance and updated orders accordingly -  Yes  Labs reviewed in EPIC  BP Readings from Last 3 Encounters:   18 116/73   17 103/69   17 120/77    Wt Readings from Last 3 Encounters:   18 147 lb (66.7 kg)   17 154 lb (69.9 kg)   17 155 lb (70.3 kg)                  Patient Active Problem List   Diagnosis     Anxiety     Adjustment disorder with mixed anxiety and depressed mood     Past Surgical History:   Procedure Laterality Date     BACK SURGERY  2008    tailbone surgery     NO HISTORY OF SURGERY         Social History   Substance Use Topics     Smoking status: Never Smoker     Smokeless tobacco: Never Used     Alcohol use Yes      Comment: socially     Family History   Problem Relation Age of Onset     Breast Cancer No family hx of      mother grandmother               Mammogram not appropriate for this patient based on age.    Pertinent mammograms are reviewed under the imaging tab.  History of abnormal Pap smear: NO - age 21-29 PAP every 3 years recommended    Reviewed and updated as needed this visit by clinical staff  Tobacco  Allergies  Meds  Med Hx  Surg Hx  Fam Hx  Soc Hx        Reviewed and updated as needed this visit by Provider        Past Medical History:   Diagnosis Date     Anxiety       Past Surgical History:   Procedure Laterality Date     BACK SURGERY      tailbone surgery     NO HISTORY OF SURGERY       Obstetric History       T0      L0     SAB0   TAB0   Ectopic0   Multiple0   Live Births0           ROS:  C: NEGATIVE for fever, chills, change in weight  I: NEGATIVE for worrisome rashes, moles or lesions  E: NEGATIVE for vision changes or irritation  ENT: NEGATIVE for ear, mouth and throat problems  R: NEGATIVE for significant cough or SOB  B: NEGATIVE for masses, tenderness or discharge  CV: NEGATIVE for chest pain, palpitations or peripheral edema  GI: NEGATIVE for nausea, abdominal pain, heartburn, or change in bowel habits  : NEGATIVE for unusual urinary or vaginal  symptoms. Periods are regular.  M: NEGATIVE for significant arthralgias or myalgia  N: NEGATIVE for weakness, dizziness or paresthesias  P: NEGATIVE for changes in mood or affect    OBJECTIVE:   There were no vitals taken for this visit.  EXAM:  GENERAL: healthy, alert and no distress  EYES: Eyes grossly normal to inspection, PERRL and conjunctivae and sclerae normal  HENT: ear canals and TM's normal, nose and mouth without ulcers or lesions  NECK: no adenopathy, no asymmetry, masses, or scars and thyroid normal to palpation  RESP: lungs clear to auscultation - no rales, rhonchi or wheezes  BREAST: normal without masses, tenderness or nipple discharge and no palpable axillary masses or adenopathy  CV: regular rate and rhythm, normal S1 S2, no S3 or S4, no murmur, click or rub, no peripheral edema and peripheral pulses strong  ABDOMEN: soft, nontender, no hepatosplenomegaly, no masses and bowel sounds normal   (female): normal female external genitalia, normal urethral meatus, vaginal mucosa pink, moist, well rugated, and normal cervix/adnexa/uterus without masses or discharge  MS: no gross musculoskeletal defects noted, no edema  SKIN: no suspicious lesions, one large quarter sized firm mobile cyst middle of back, patient will recheck with derm they are already following it, likely needs to be removed due to size  NEURO: Normal strength and tone, mentation intact and speech normal  PSYCH: mentation appears normal, affect normal/bright    ASSESSMENT/PLAN:   1. Encounter for routine adult health examination without abnormal findings    - Folate  - CBC with platelets differential    Patient wants folate checked    2. Special screening examination for chlamydial disease      3. Screening for malignant neoplasm of cervix    - Pap imaged thin layer screen reflex to HPV if ASCUS - recommend age 25 - 29    4. Screen for STD (sexually transmitted disease)    - Chlamydia trachomatis PCR    5. Screening for diabetes  "mellitus    - Lipid panel reflex to direct LDL Fasting    6. Lipid screening    - Comprehensive metabolic panel    7. Acne vulgaris    - drospirenone-ethinyl estradiol (DENNIS) 3-0.02 MG per tablet; Take 1 tablet by mouth daily  Dispense: 28 tablet; Refill: 3    COUNSELING:   Reviewed preventive health counseling, as reflected in patient instructions       Regular exercise       Healthy diet/nutrition       Vision screening       Hearing screening         reports that she has never smoked. She has never used smokeless tobacco.    Estimated body mass index is 27.28 kg/(m^2) as calculated from the following:    Height as of 1/17/17: 5' 3\" (1.6 m).    Weight as of 6/22/17: 154 lb (69.9 kg).     Patient Instructions   Birth control instructions:    1) Take at the same time every day.    2) The more times you miss a pill, the less effective the pill with be for contraception and managing periods.    3)  The pill will become effective at preventing pregnancy after 7 days of use.  Use a back up condom until then.    4)  Birth control does not protect you against sexually transmitted infections (STDS or STIS) so always use protection if not in a monogamous relationship.    5) Risks/side effects can include blood clots, high blood pressure, headaches, nausea (try taking with food if this occurs).  Do not smoke with this as this increases your blood clot risk.  If you become pregnant, stop taking the pill right away.    6) The first 3 months of being on birth control, you may have irregular periods or spotting.  Typically this will even out after your body adjusts.  If you have any questions or concerns, let me know.       '  Preventive Health Recommendations  Female Ages 18 to 25     Yearly exam:     See your health care provider every year in order to  o Review health changes.   o Discuss preventive care.    o Review your medicines if your doctor has prescribed any.      You should be tested each year for STDs (sexually " transmitted diseases).       After age 20, talk to your provider about how often you should have cholesterol testing.      Starting at age 21, get a Pap test every three years. If you have an abnormal result, your doctor may have you test more often.      If you are at risk for diabetes, you should have a diabetes test (fasting glucose).     Shots:     Get a flu shot each year.     Get a tetanus shot every 10 years.     Consider getting the shot (vaccine) that prevents cervical cancer (Gardasil).    Nutrition:     Eat at least 5 servings of fruits and vegetables each day.    Eat whole-grain bread, whole-wheat pasta and brown rice instead of white grains and rice.    Talk to your provider about Calcium and Vitamin D.     Lifestyle    Exercise at least 150 minutes a week each week (30 minutes a day, 5 days a week). This will help you control your weight and prevent disease.    Limit alcohol to one drink per day.    No smoking.     Wear sunscreen to prevent skin cancer.    See your dentist every six months for an exam and cleaning.          Counseling Resources:  ATP IV Guidelines  Pooled Cohorts Equation Calculator  Breast Cancer Risk Calculator  FRAX Risk Assessment  ICSI Preventive Guidelines  Dietary Guidelines for Americans, 2010  USDA's MyPlate  ASA Prophylaxis  Lung CA Screening    Brenda Larry PA-C  [facility]

## 2018-01-07 ENCOUNTER — HEALTH MAINTENANCE LETTER (OUTPATIENT)
Age: 28
End: 2018-01-07

## 2018-01-11 ENCOUNTER — TELEPHONE (OUTPATIENT)
Dept: FAMILY MEDICINE | Facility: CLINIC | Age: 28
End: 2018-01-11

## 2018-01-11 ENCOUNTER — OFFICE VISIT (OUTPATIENT)
Dept: FAMILY MEDICINE | Facility: CLINIC | Age: 28
End: 2018-01-11
Payer: COMMERCIAL

## 2018-01-11 VITALS
OXYGEN SATURATION: 97 % | TEMPERATURE: 99.4 F | SYSTOLIC BLOOD PRESSURE: 116 MMHG | DIASTOLIC BLOOD PRESSURE: 73 MMHG | BODY MASS INDEX: 26.05 KG/M2 | HEIGHT: 63 IN | HEART RATE: 83 BPM | WEIGHT: 147 LBS

## 2018-01-11 DIAGNOSIS — Z12.4 SCREENING FOR MALIGNANT NEOPLASM OF CERVIX: ICD-10-CM

## 2018-01-11 DIAGNOSIS — Z13.220 LIPID SCREENING: ICD-10-CM

## 2018-01-11 DIAGNOSIS — Z11.3 SCREEN FOR STD (SEXUALLY TRANSMITTED DISEASE): ICD-10-CM

## 2018-01-11 DIAGNOSIS — Z00.00 ENCOUNTER FOR ROUTINE ADULT HEALTH EXAMINATION WITHOUT ABNORMAL FINDINGS: Primary | ICD-10-CM

## 2018-01-11 DIAGNOSIS — Z11.8 SPECIAL SCREENING EXAMINATION FOR CHLAMYDIAL DISEASE: ICD-10-CM

## 2018-01-11 DIAGNOSIS — Z13.1 SCREENING FOR DIABETES MELLITUS: ICD-10-CM

## 2018-01-11 DIAGNOSIS — L70.0 ACNE VULGARIS: ICD-10-CM

## 2018-01-11 LAB
ALBUMIN SERPL-MCNC: 4.1 G/DL (ref 3.4–5)
ALP SERPL-CCNC: 45 U/L (ref 40–150)
ALT SERPL W P-5'-P-CCNC: 18 U/L (ref 0–50)
ANION GAP SERPL CALCULATED.3IONS-SCNC: 6 MMOL/L (ref 3–14)
AST SERPL W P-5'-P-CCNC: 12 U/L (ref 0–45)
BASOPHILS # BLD AUTO: 0 10E9/L (ref 0–0.2)
BASOPHILS NFR BLD AUTO: 0.3 %
BILIRUB SERPL-MCNC: 0.6 MG/DL (ref 0.2–1.3)
BUN SERPL-MCNC: 22 MG/DL (ref 7–30)
CALCIUM SERPL-MCNC: 9 MG/DL (ref 8.5–10.1)
CHLORIDE SERPL-SCNC: 106 MMOL/L (ref 94–109)
CHOLEST SERPL-MCNC: 144 MG/DL
CO2 SERPL-SCNC: 27 MMOL/L (ref 20–32)
CREAT SERPL-MCNC: 0.74 MG/DL (ref 0.52–1.04)
DIFFERENTIAL METHOD BLD: NORMAL
EOSINOPHIL # BLD AUTO: 0.1 10E9/L (ref 0–0.7)
EOSINOPHIL NFR BLD AUTO: 0.9 %
ERYTHROCYTE [DISTWIDTH] IN BLOOD BY AUTOMATED COUNT: 11.8 % (ref 10–15)
FOLATE SERPL-MCNC: 24.6 NG/ML
GFR SERPL CREATININE-BSD FRML MDRD: >90 ML/MIN/1.7M2
GLUCOSE SERPL-MCNC: 86 MG/DL (ref 70–99)
HCT VFR BLD AUTO: 39.1 % (ref 35–47)
HDLC SERPL-MCNC: 62 MG/DL
HGB BLD-MCNC: 13.2 G/DL (ref 11.7–15.7)
LDLC SERPL CALC-MCNC: 71 MG/DL
LYMPHOCYTES # BLD AUTO: 2.4 10E9/L (ref 0.8–5.3)
LYMPHOCYTES NFR BLD AUTO: 23.7 %
MCH RBC QN AUTO: 31 PG (ref 26.5–33)
MCHC RBC AUTO-ENTMCNC: 33.8 G/DL (ref 31.5–36.5)
MCV RBC AUTO: 92 FL (ref 78–100)
MONOCYTES # BLD AUTO: 0.6 10E9/L (ref 0–1.3)
MONOCYTES NFR BLD AUTO: 5.8 %
NEUTROPHILS # BLD AUTO: 6.9 10E9/L (ref 1.6–8.3)
NEUTROPHILS NFR BLD AUTO: 69.3 %
NONHDLC SERPL-MCNC: 82 MG/DL
PLATELET # BLD AUTO: 358 10E9/L (ref 150–450)
POTASSIUM SERPL-SCNC: 3.8 MMOL/L (ref 3.4–5.3)
PROT SERPL-MCNC: 7.7 G/DL (ref 6.8–8.8)
RBC # BLD AUTO: 4.26 10E12/L (ref 3.8–5.2)
SODIUM SERPL-SCNC: 139 MMOL/L (ref 133–144)
TRIGL SERPL-MCNC: 54 MG/DL
WBC # BLD AUTO: 9.9 10E9/L (ref 4–11)

## 2018-01-11 PROCEDURE — 87491 CHLMYD TRACH DNA AMP PROBE: CPT | Performed by: PHYSICIAN ASSISTANT

## 2018-01-11 PROCEDURE — 80061 LIPID PANEL: CPT | Performed by: PHYSICIAN ASSISTANT

## 2018-01-11 PROCEDURE — 80053 COMPREHEN METABOLIC PANEL: CPT | Performed by: PHYSICIAN ASSISTANT

## 2018-01-11 PROCEDURE — 36415 COLL VENOUS BLD VENIPUNCTURE: CPT | Performed by: PHYSICIAN ASSISTANT

## 2018-01-11 PROCEDURE — G0124 SCREEN C/V THIN LAYER BY MD: HCPCS | Performed by: PHYSICIAN ASSISTANT

## 2018-01-11 PROCEDURE — G0145 SCR C/V CYTO,THINLAYER,RESCR: HCPCS | Performed by: PHYSICIAN ASSISTANT

## 2018-01-11 PROCEDURE — 87624 HPV HI-RISK TYP POOLED RSLT: CPT | Performed by: PHYSICIAN ASSISTANT

## 2018-01-11 PROCEDURE — 85025 COMPLETE CBC W/AUTO DIFF WBC: CPT | Performed by: PHYSICIAN ASSISTANT

## 2018-01-11 PROCEDURE — 99395 PREV VISIT EST AGE 18-39: CPT | Performed by: PHYSICIAN ASSISTANT

## 2018-01-11 PROCEDURE — 82746 ASSAY OF FOLIC ACID SERUM: CPT | Performed by: PHYSICIAN ASSISTANT

## 2018-01-11 RX ORDER — DROSPIRENONE AND ETHINYL ESTRADIOL 0.02-3(28)
1 KIT ORAL DAILY
Qty: 28 TABLET | Refills: 3 | Status: SHIPPED | OUTPATIENT
Start: 2018-01-11 | End: 2018-04-29

## 2018-01-11 NOTE — TELEPHONE ENCOUNTER
Patient is requesting her medication for birth control be sent to new pharmacy  [facility] in [place]  Thank you

## 2018-01-11 NOTE — NURSING NOTE
"Chief Complaint   Patient presents with     Physical     AFE, Pt is fasting        Initial /73  Pulse 83  Temp 99.4  F (37.4  C) (Oral)  Ht 5' 3\" (1.6 m)  Wt 147 lb (66.7 kg)  LMP 01/06/2018 (Approximate)  SpO2 97%  Breastfeeding? No  BMI 26.04 kg/m2 Estimated body mass index is 26.04 kg/(m^2) as calculated from the following:    Height as of this encounter: 5' 3\" (1.6 m).    Weight as of this encounter: 147 lb (66.7 kg).  Medication Reconciliation: complete      Clari Montague MA    "

## 2018-01-11 NOTE — TELEPHONE ENCOUNTER
Prescription was sent to [facility] on 1-11-18.  Left message that the pharmacy's are electronically connected and she can just call [facility] and tell them the [facility] and they can look on there system and fill it for her.  Charis Contreras RN

## 2018-01-11 NOTE — MR AVS SNAPSHOT
After Visit Summary   1/11/2018    Charis Kruger    MRN: [id number]           Patient Information     Date Of Birth          1990        Visit Information        Provider Department      1/11/2018 7:00 AM Brenda Larry PA-C [facility]        Today's Diagnoses     Encounter for routine adult health examination without abnormal findings    -  1    Special screening examination for chlamydial disease        Screening for malignant neoplasm of cervix        Screen for STD (sexually transmitted disease)        Screening for diabetes mellitus        Lipid screening        Acne vulgaris          Care Instructions    Birth control instructions:    1) Take at the same time every day.    2) The more times you miss a pill, the less effective the pill with be for contraception and managing periods.    3)  The pill will become effective at preventing pregnancy after 7 days of use.  Use a back up condom until then.    4)  Birth control does not protect you against sexually transmitted infections (STDS or STIS) so always use protection if not in a monogamous relationship.    5) Risks/side effects can include blood clots, high blood pressure, headaches, nausea (try taking with food if this occurs).  Do not smoke with this as this increases your blood clot risk.  If you become pregnant, stop taking the pill right away.    6) The first 3 months of being on birth control, you may have irregular periods or spotting.  Typically this will even out after your body adjusts.  If you have any questions or concerns, let me know.       '  Preventive Health Recommendations  Female Ages 18 to 25     Yearly exam:     See your health care provider every year in order to  o Review health changes.   o Discuss preventive care.    o Review your medicines if your doctor has prescribed any.      You should be tested each year for STDs (sexually transmitted diseases).       After age 20, talk to your  provider about how often you should have cholesterol testing.      Starting at age 21, get a Pap test every three years. If you have an abnormal result, your doctor may have you test more often.      If you are at risk for diabetes, you should have a diabetes test (fasting glucose).     Shots:     Get a flu shot each year.     Get a tetanus shot every 10 years.     Consider getting the shot (vaccine) that prevents cervical cancer (Gardasil).    Nutrition:     Eat at least 5 servings of fruits and vegetables each day.    Eat whole-grain bread, whole-wheat pasta and brown rice instead of white grains and rice.    Talk to your provider about Calcium and Vitamin D.     Lifestyle    Exercise at least 150 minutes a week each week (30 minutes a day, 5 days a week). This will help you control your weight and prevent disease.    Limit alcohol to one drink per day.    No smoking.     Wear sunscreen to prevent skin cancer.    See your dentist every six months for an exam and cleaning.          Follow-ups after your visit        Who to contact     If you have questions or need follow up information about today's clinic visit or your schedule please contact [facility] directly at [phone].  Normal or non-critical lab and imaging results will be communicated to you by MyChart, letter or phone within 4 business days after the clinic has received the results. If you do not hear from us within 7 days, please contact the clinic through MyChart or phone. If you have a critical or abnormal lab result, we will notify you by phone as soon as possible.  Submit refill requests through [employer] or call your pharmacy and they will forward the refill request to us. Please allow 3 business days for your refill to be completed.          Additional Information About Your Visit        Care EveryWhere ID     This is your Care EveryWhere ID. This could be used by other organizations to access your [facility]  "records  [id number]        Your Vitals Were     Pulse Temperature Height Last Period Pulse Oximetry Breastfeeding?    83 99.4  F (37.4  C) (Oral) 5' 3\" (1.6 m) 01/06/2018 (Approximate) 97% No    BMI (Body Mass Index)                   26.04 kg/m2            Blood Pressure from Last 3 Encounters:   01/11/18 116/73   06/22/17 103/69   06/21/17 120/77    Weight from Last 3 Encounters:   01/11/18 147 lb (66.7 kg)   06/22/17 154 lb (69.9 kg)   06/21/17 155 lb (70.3 kg)              We Performed the Following     CBC with platelets differential     Chlamydia trachomatis PCR     Comprehensive metabolic panel     Folate     Lipid panel reflex to direct LDL Fasting     Pap imaged thin layer screen reflex to HPV if ASCUS - recommend age 25 - 29          Today's Medication Changes          These changes are accurate as of: 1/11/18  7:48 AM.  If you have any questions, ask your nurse or doctor.               Start taking these medicines.        Dose/Directions    drospirenone-ethinyl estradiol 3-0.02 MG per tablet   Commonly known as:  DENNIS   Used for:  Acne vulgaris   Started by:  Brenda Larry PA-C        Dose:  1 tablet   Take 1 tablet by mouth daily   Quantity:  28 tablet   Refills:  3            Where to get your medicines      These medications were sent to [place] [address] [place], [place] - [address]  [address] [address]     Phone:  [phone]     drospirenone-ethinyl estradiol 3-0.02 MG per tablet                Primary Care Provider Office Phone # Fax #    Brenda Larry PA-C [phone] [address] [address] [facility] [address]        Equal Access to Services     GUNNER ROBERTSON AH: Ragini Lewis, wabarney lurose, qalopez kaalmada eric, maryjo elam. [facility] [phone].    ATENCIÓN: Si habla español, tiene a hubbard disposición servicios gratuitos de asistencia lingüística. Llame al " [phone].    We comply with applicable federal civil rights laws and [place] laws. We do not discriminate on the basis of race, color, national origin, age, disability, sex, sexual orientation, or gender identity.            Thank you!     Thank you for choosing [facility]  for your care. Our goal is always to provide you with excellent care. Hearing back from our patients is one way we can continue to improve our services. Please take a few minutes to complete the written survey that you may receive in the mail after your visit with us. Thank you!             Your Updated Medication List - Protect others around you: Learn how to safely use, store and throw away your medicines at www.disposemymeds.org.          This list is accurate as of: 1/11/18  7:48 AM.  Always use your most recent med list.                   Brand Name Dispense Instructions for use Diagnosis    drospirenone-ethinyl estradiol 3-0.02 MG per tablet    DENNIS    28 tablet    Take 1 tablet by mouth daily    Acne vulgaris

## 2018-01-11 NOTE — LETTER
January 15, 2018    Charis Kruger  [address] [address]        Dear Charis,    It was a pleasure to see you at your recent office visit.  Your test results are listed below.  Folate is normal. Cholesterol looks great. White and red blood cell counts normal.  No anemia.  Sodium, potassium, kidney and liver function normal.  Blood sugar normal, no diabetes.  Chlamydia negative/normal.           If you have any questions or concerns, please call the clinic at [phone].    Sincerely,  Brenda Larry PA-C    Results for orders placed or performed in visit on 01/11/18   Folate   Result Value Ref Range    Folate 24.6 >5.4 ng/mL   CBC with platelets differential   Result Value Ref Range    WBC 9.9 4.0 - 11.0 10e9/L    RBC Count 4.26 3.8 - 5.2 10e12/L    Hemoglobin 13.2 11.7 - 15.7 g/dL    Hematocrit 39.1 35.0 - 47.0 %    MCV 92 78 - 100 fl    MCH 31.0 26.5 - 33.0 pg    MCHC 33.8 31.5 - 36.5 g/dL    RDW 11.8 10.0 - 15.0 %    Platelet Count 358 150 - 450 10e9/L    Diff Method Automated Method     % Neutrophils 69.3 %    % Lymphocytes 23.7 %    % Monocytes 5.8 %    % Eosinophils 0.9 %    % Basophils 0.3 %    Absolute Neutrophil 6.9 1.6 - 8.3 10e9/L    Absolute Lymphocytes 2.4 0.8 - 5.3 10e9/L    Absolute Monocytes 0.6 0.0 - 1.3 10e9/L    Absolute Eosinophils 0.1 0.0 - 0.7 10e9/L    Absolute Basophils 0.0 0.0 - 0.2 10e9/L   Lipid panel reflex to direct LDL Fasting   Result Value Ref Range    Cholesterol 144 <200 mg/dL    Triglycerides 54 <150 mg/dL    HDL Cholesterol 62 >49 mg/dL    LDL Cholesterol Calculated 71 <100 mg/dL    Non HDL Cholesterol 82 <130 mg/dL   Comprehensive metabolic panel   Result Value Ref Range    Sodium 139 133 - 144 mmol/L    Potassium 3.8 3.4 - 5.3 mmol/L    Chloride 106 94 - 109 mmol/L    Carbon Dioxide 27 20 - 32 mmol/L    Anion Gap 6 3 - 14 mmol/L    Glucose 86 70 - 99 mg/dL    Urea Nitrogen 22 7 - 30 mg/dL    Creatinine 0.74 0.52 - 1.04 mg/dL    GFR Estimate  >90 >60 mL/min/1.7m2    GFR Estimate If Black >90 >60 mL/min/1.7m2    Calcium 9.0 8.5 - 10.1 mg/dL    Bilirubin Total 0.6 0.2 - 1.3 mg/dL    Albumin 4.1 3.4 - 5.0 g/dL    Protein Total 7.7 6.8 - 8.8 g/dL    Alkaline Phosphatase 45 40 - 150 U/L    ALT 18 0 - 50 U/L    AST 12 0 - 45 U/L   Chlamydia trachomatis PCR   Result Value Ref Range    Specimen Description Endocervical     Chlamydia Trachomatis PCR Negative NEG^Negative

## 2018-01-11 NOTE — LETTER
January 18, 2018    Charis Kruger  [address] [address]    Dear Charis,  We are happy to inform you that your PAP smear result from 1/11/18 is normal.  We are now able to do a follow up test on PAP smears. The DNA test is for HPV (Human Papilloma Virus). Cervical cancer is closely linked with certain types of HPV. Your result showed no evidence of high risk HPV.  Therefore we recommend you return in 3 years for your next pap smear and HPV test.  You will still need to return to the clinic every year for an annual exam and other preventive tests.  Please contact the clinic at [phone] with any questions.  Sincerely,    Brenda Larry PA-C/cayetano

## 2018-01-12 LAB
C TRACH DNA SPEC QL NAA+PROBE: NEGATIVE
SPECIMEN SOURCE: NORMAL

## 2018-01-14 NOTE — PROGRESS NOTES
Dear Charis,      It was a pleasure to see you at your recent office visit.  Your test results are listed below.  Folate is normal. Cholesterol looks great. White and red blood cell counts normal.  No anemia.  Sodium, potassium, kidney and liver function normal.  Blood sugar normal, no diabetes.  Chlamydia negative/normal.           If you have any questions or concerns, please call the clinic at [phone].    Sincerely,  Brenda Larry PA-C

## 2018-01-16 LAB
COPATH REPORT: ABNORMAL
PAP: ABNORMAL

## 2018-01-17 LAB
FINAL DIAGNOSIS: NORMAL
HPV HR 12 DNA CVX QL NAA+PROBE: NEGATIVE
HPV16 DNA SPEC QL NAA+PROBE: NEGATIVE
HPV18 DNA SPEC QL NAA+PROBE: NEGATIVE
SPECIMEN DESCRIPTION: NORMAL
SPECIMEN SOURCE CVX/VAG CYTO: NORMAL

## 2018-01-18 PROBLEM — R87.610 ASCUS OF CERVIX WITH NEGATIVE HIGH RISK HPV: Status: ACTIVE | Noted: 2018-01-11

## 2018-04-29 DIAGNOSIS — L70.0 ACNE VULGARIS: ICD-10-CM

## 2018-05-02 RX ORDER — DROSPIRENONE AND ETHINYL ESTRADIOL TABLETS 0.02-3(28)
KIT ORAL
Qty: 28 TABLET | Refills: 2 | Status: SHIPPED | OUTPATIENT
Start: 2018-05-02 | End: 2018-05-24

## 2018-05-24 ENCOUNTER — OFFICE VISIT (OUTPATIENT)
Dept: FAMILY MEDICINE | Facility: CLINIC | Age: 28
End: 2018-05-24
Payer: COMMERCIAL

## 2018-05-24 VITALS
RESPIRATION RATE: 14 BRPM | BODY MASS INDEX: 25.51 KG/M2 | OXYGEN SATURATION: 99 % | TEMPERATURE: 100.2 F | HEART RATE: 93 BPM | WEIGHT: 144 LBS | DIASTOLIC BLOOD PRESSURE: 78 MMHG | SYSTOLIC BLOOD PRESSURE: 116 MMHG

## 2018-05-24 DIAGNOSIS — F41.9 ANXIETY: Primary | ICD-10-CM

## 2018-05-24 DIAGNOSIS — F41.0 PANIC ATTACK: ICD-10-CM

## 2018-05-24 PROCEDURE — 99214 OFFICE O/P EST MOD 30 MIN: CPT | Performed by: PHYSICIAN ASSISTANT

## 2018-05-24 RX ORDER — ALPRAZOLAM 0.5 MG
TABLET ORAL
Qty: 15 TABLET | Refills: 1 | Status: SHIPPED | OUTPATIENT
Start: 2018-05-24 | End: 2018-09-27

## 2018-05-24 RX ORDER — CITALOPRAM HYDROBROMIDE 40 MG/1
TABLET ORAL
Qty: 30 TABLET | Refills: 1 | Status: SHIPPED | OUTPATIENT
Start: 2018-05-24 | End: 2018-07-05

## 2018-05-24 ASSESSMENT — ANXIETY QUESTIONNAIRES
2. NOT BEING ABLE TO STOP OR CONTROL WORRYING: NEARLY EVERY DAY
1. FEELING NERVOUS, ANXIOUS, OR ON EDGE: NEARLY EVERY DAY
6. BECOMING EASILY ANNOYED OR IRRITABLE: NEARLY EVERY DAY
5. BEING SO RESTLESS THAT IT IS HARD TO SIT STILL: MORE THAN HALF THE DAYS
7. FEELING AFRAID AS IF SOMETHING AWFUL MIGHT HAPPEN: NEARLY EVERY DAY
GAD7 TOTAL SCORE: 20
IF YOU CHECKED OFF ANY PROBLEMS ON THIS QUESTIONNAIRE, HOW DIFFICULT HAVE THESE PROBLEMS MADE IT FOR YOU TO DO YOUR WORK, TAKE CARE OF THINGS AT HOME, OR GET ALONG WITH OTHER PEOPLE: VERY DIFFICULT
3. WORRYING TOO MUCH ABOUT DIFFERENT THINGS: NEARLY EVERY DAY

## 2018-05-24 ASSESSMENT — PATIENT HEALTH QUESTIONNAIRE - PHQ9: 5. POOR APPETITE OR OVEREATING: NEARLY EVERY DAY

## 2018-05-24 NOTE — NURSING NOTE
"Chief Complaint   Patient presents with     Anxiety     discuss getting back on anxiety meds     Health Maintenance     UTD       Initial /78  Pulse 93  Temp 100.2  F (37.9  C) (Oral)  Resp 14  Wt 144 lb (65.3 kg)  SpO2 99%  Breastfeeding? No  BMI 25.51 kg/m2 Estimated body mass index is 25.51 kg/(m^2) as calculated from the following:    Height as of 1/11/18: 5' 3\" (1.6 m).    Weight as of this encounter: 144 lb (65.3 kg).  Medication Reconciliation: complete      Clari Montague MA    "

## 2018-05-24 NOTE — MR AVS SNAPSHOT
After Visit Summary   5/24/2018    Charis Kruger    MRN: [id number]           Patient Information     Date Of Birth          1990        Visit Information        Provider Department      5/24/2018 9:20 AM Brenda Larry PA-C [facility]        Today's Diagnoses     Anxiety    -  1    Panic attack          Care Instructions    Stop oral contraceptive pill now  1)     Re-check with me in 3-4 weeks.  If doing really well, this can be via phone or mychart.  Otherwise re-check in clinic in 2 months please.      Call with side effects or concerns.     Medication should start to work within 1-2 weeks but will not have full effect for about 6 weeks.     If medication makes you feel worse, stop right away and recheck with me.     If suicidal thoughts or plan occur, call 911 or go to emergency room.         2)  As your body allows, Try to start exercising see this article from [facility]:  Depression and anxiety: Exercise eases symptoms  Depression and anxiety symptoms often improve with exercise. Here are some realistic tips to help you get started and stay motivated.  By [facility] Staff   When you have depression or anxiety, exercise often seems like the last thing you want to do. But once you get motivated, exercise can make a big difference.  Exercise helps prevent and improve a number of health problems, including high blood pressure, diabetes and arthritis. Research on depression, anxiety and exercise shows that the psychological and physical benefits of exercise can also help improve mood and reduce anxiety.  The links between depression, anxiety and exercise aren't entirely clear -- but working out and other forms of physical activity can definitely ease symptoms of depression or anxiety and make you feel better. Exercise may also help keep depression and anxiety from coming back once you're feeling better.  How does exercise help depression and anxiety?  Regular  "exercise may help ease depression and anxiety by:  Releasing feel-good endorphins, natural cannabis-like brain chemicals (endogenous cannabinoids) and other natural brain chemicals that can enhance your sense of well-being   Taking your mind off worries so you can get away from the cycle of negative thoughts that feed depression and anxiety  Regular exercise has many psychological and emotional benefits, too. It can help you:  Gain confidence. Meeting exercise goals or challenges, even small ones, can boost your self-confidence. Getting in shape can also make you feel better about your appearance.   Get more social interaction. Exercise and physical activity may give you the chance to meet or socialize with others. Just exchanging a friendly smile or greeting as you walk around your neighborhood can help your mood.   [place] in a healthy way. Doing something positive to manage depression or anxiety is a healthy coping strategy. Trying to feel better by drinking alcohol, dwelling on how you feel, or hoping depression or anxiety will go away on its own can lead to worsening symptoms.  Is a structured exercise program the only option?  Some research shows that physical activity such as regular walking -- not just formal exercise programs -- may help improve mood. Physical activity and exercise are not the same thing, but both are beneficial to your health.  Physical activity is any activity that works your muscles and requires energy and can include work or household or leisure activities.   Exercise is a planned, structured and repetitive body movement done to improve or maintain physical fitness.  The word \"exercise\" may make you think of running laps around the gym. But exercise includes a wide range of activities that boost your activity level to help you feel better.  Certainly running, lifting weights, playing basketball and other fitness activities that get your heart pumping can help. But so can physical activity " such as gardening, washing your car, walking around the block or engaging in other less intense activities. Any physical activity that gets you off the couch and moving can help improve your mood.  You don't have to do all your exercise or other physical activity at once. Broaden how you think of exercise and find ways to add small amounts of physical activity throughout your day. For example, take the stairs instead of the elevator. Park a little farther away from work to fit in a short walk. Or, if you live close to your job, consider biking to work.        FREE AND CONFIDENTIAL 24/7 MENTAL HEALTH OR CRISIS HOTLINES:  BY [facility] you live in:      [place]    Adult  [phone]                     Child   [phone]      [place] Adult [phone]                                             Child [phone]    [facility] [place]             Adult [phone]                                            Child [phone]                  Follow-ups after your visit        Additional Services     MENTAL HEALTH REFERRAL  - Adult; Outpatient Treatment; Individual/Couples/Family/Group Therapy/Health Psychology; G: [facility] [phone]; We will contact you to schedule the appointment or please call with any questions       All scheduling is subject to the client's specific insurance plan & benefits, provider/location availability, and provider clinical specialities.  Please arrive 15 minutes early for your first appointment and bring your completed paperwork.    Please be aware that coverage of these services is subject to the terms and limitations of your health insurance plan.  Call member services at your health plan with any benefit or coverage questions.                            Who to contact     If you have questions or need follow up information about today's clinic visit or your schedule please contact [facility] directly at [phone].  Normal or  non-critical lab and imaging results will be communicated to you by MyChart, letter or phone within 4 business days after the clinic has received the results. If you do not hear from us within 7 days, please contact the clinic through MyChart or phone. If you have a critical or abnormal lab result, we will notify you by phone as soon as possible.  Submit refill requests through [employer] or call your pharmacy and they will forward the refill request to us. Please allow 3 business days for your refill to be completed.          Additional Information About Your Visit        Care EveryWhere ID     This is your Care EveryWhere ID. This could be used by other organizations to access your [place] medical records  [id number]        Your Vitals Were     Pulse Temperature Respirations Pulse Oximetry Breastfeeding? BMI (Body Mass Index)    93 100.2  F (37.9  C) (Oral) 14 99% No 25.51 kg/m2       Blood Pressure from Last 3 Encounters:   05/24/18 116/78   01/11/18 116/73   06/22/17 103/69    Weight from Last 3 Encounters:   05/24/18 144 lb (65.3 kg)   01/11/18 147 lb (66.7 kg)   06/22/17 154 lb (69.9 kg)              We Performed the Following     MENTAL HEALTH REFERRAL  - Adult; Outpatient Treatment; Individual/Couples/Family/Group Therapy/Health Psychology; [facility]: [facility] [phone]; We will contact you to schedule the appointment or please call with any questions          Today's Medication Changes          These changes are accurate as of 5/24/18  9:53 AM.  If you have any questions, ask your nurse or doctor.               Start taking these medicines.        Dose/Directions    ALPRAZolam 0.5 MG tablet   Commonly known as:  XANAX   Used for:  Panic attack, Anxiety   Started by:  Brenda Larry PA-C        Take 1 tablet at onset of panic attack up to once daily as needed. Take No more than 3 times a week.   Quantity:  15 tablet   Refills:  1       citalopram 40 MG tablet   Commonly known as:   celeXA   Used for:  Anxiety   Started by:  Brenda Larry PA-C        Take 1/2 tablet (20 mg) for 1-2 weeks, then increase to 1 tablet orally daily   Quantity:  30 tablet   Refills:  1         Stop taking these medicines if you haven't already. Please contact your care team if you have questions.     LORYNA 3-0.02 MG per tablet   Generic drug:  drospirenone-ethinyl estradiol   Stopped by:  Brenda Larry PA-C                Where to get your medicines      These medications were sent to [place] [address] [place] [facility] [address]  [address] [address]     Phone:  [phone]     citalopram 40 MG tablet         Some of these will need a paper prescription and others can be bought over the counter.  Ask your nurse if you have questions.     Bring a paper prescription for each of these medications     ALPRAZolam 0.5 MG tablet                Primary Care Provider Office Phone # Fax #    Brenda Larry PA-C [phone] [address] [facility] [address]        Equal Access to Services     [facility]: Hadii miles ku hadasho Soomaali, waaxda luqadaha, qaybta kaalmada adeegyada, maryjo tello . So [facility] [phone].    ATENCIÓN: Si habla español, tiene a hubbard disposición servicios grathansos de asistencia lingüística. [facility] [phone].    We comply with applicable federal civil rights laws and [place] laws. We do not discriminate on the basis of race, color, national origin, age, disability, sex, sexual orientation, or gender identity.            Thank you!     Thank you for choosing [facility]  for your care. Our goal is always to provide you with excellent care. Hearing back from our patients is one way we can continue to improve our services. Please take a few minutes to complete the written survey that you may receive in the mail after your visit with us. Thank you!              Your Updated Medication List - Protect others around you: Learn how to safely use, store and throw away your medicines at www.disposemymeds.org.          This list is accurate as of 5/24/18  9:53 AM.  Always use your most recent med list.                   Brand Name Dispense Instructions for use Diagnosis    ALPRAZolam 0.5 MG tablet    XANAX    15 tablet    Take 1 tablet at onset of panic attack up to once daily as needed. Take No more than 3 times a week.    Panic attack, Anxiety       citalopram 40 MG tablet    celeXA    30 tablet    Take 1/2 tablet (20 mg) for 1-2 weeks, then increase to 1 tablet orally daily    Anxiety

## 2018-05-24 NOTE — PATIENT INSTRUCTIONS
Stop oral contraceptive pill now  1)     Re-check with me in 3-4 weeks.  If doing really well, this can be via phone or mychart.  Otherwise re-check in clinic in 2 months please.      Call with side effects or concerns.     Medication should start to work within 1-2 weeks but will not have full effect for about 6 weeks.     If medication makes you feel worse, stop right away and recheck with me.     If suicidal thoughts or plan occur, call 911 or go to emergency room.         2)  As your body allows, Try to start exercising see this article from [facility]:  Depression and anxiety: Exercise eases symptoms  Depression and anxiety symptoms often improve with exercise. Here are some realistic tips to help you get started and stay motivated.  By [facility] Staff   When you have depression or anxiety, exercise often seems like the last thing you want to do. But once you get motivated, exercise can make a big difference.  Exercise helps prevent and improve a number of health problems, including high blood pressure, diabetes and arthritis. Research on depression, anxiety and exercise shows that the psychological and physical benefits of exercise can also help improve mood and reduce anxiety.  The links between depression, anxiety and exercise aren't entirely clear   but working out and other forms of physical activity can definitely ease symptoms of depression or anxiety and make you feel better. Exercise may also help keep depression and anxiety from coming back once you're feeling better.  How does exercise help depression and anxiety?  Regular exercise may help ease depression and anxiety by:  Releasing feel-good endorphins, natural cannabis-like brain chemicals (endogenous cannabinoids) and other natural brain chemicals that can enhance your sense of well-being   Taking your mind off worries so you can get away from the cycle of negative thoughts that feed depression and anxiety  Regular exercise has many  "psychological and emotional benefits, too. It can help you:  Gain confidence. Meeting exercise goals or challenges, even small ones, can boost your self-confidence. Getting in shape can also make you feel better about your appearance.   Get more social interaction. Exercise and physical activity may give you the chance to meet or socialize with others. Just exchanging a friendly smile or greeting as you walk around your neighborhood can help your mood.   [place] in a healthy way. Doing something positive to manage depression or anxiety is a healthy coping strategy. Trying to feel better by drinking alcohol, dwelling on how you feel, or hoping depression or anxiety will go away on its own can lead to worsening symptoms.  Is a structured exercise program the only option?  Some research shows that physical activity such as regular walking   not just formal exercise programs   may help improve mood. Physical activity and exercise are not the same thing, but both are beneficial to your health.  Physical activity is any activity that works your muscles and requires energy and can include work or household or leisure activities.   Exercise is a planned, structured and repetitive body movement done to improve or maintain physical fitness.  The word \"exercise\" may make you think of running laps around the gym. But exercise includes a wide range of activities that boost your activity level to help you feel better.  Certainly running, lifting weights, playing basketball and other fitness activities that get your heart pumping can help. But so can physical activity such as gardening, washing your car, walking around the block or engaging in other less intense activities. Any physical activity that gets you off the couch and moving can help improve your mood.  You don't have to do all your exercise or other physical activity at once. Broaden how you think of exercise and find ways to add small amounts of physical activity " throughout your day. For example, take the stairs instead of the elevator. Park a little farther away from work to fit in a short walk. Or, if you live close to your job, consider biking to work.        FREE AND CONFIDENTIAL 24/7 MENTAL HEALTH OR CRISIS HOTLINES:  BY [facility] you live in:      [place]    Adult  [phone]                     Child   [phone]      [facility] Adult [phone]                                             Child [phone]    [facility]             Adult [phone]                                            Child [phone]

## 2018-05-24 NOTE — PROGRESS NOTES
SUBJECTIVE:                                                    Charis Kruger is a 28 year old female who presents to clinic today for the following health issues:    Abnormal Mood Symptoms  Onset: per pt for a few months but worst the past few days    Description:   Depression: YES  Anxiety: YES    Accompanying Signs & Symptoms:  Still participating in activities that you used to enjoy: no  Fatigue: YES  Irritability: YES  Difficulty concentrating: YES  Changes in appetite: YES  Problems with sleep: no  Heart racing/beating fast : YES  Thoughts of hurting yourself or others: yes but NOT ACTIVE PLAN OR SERIOUS THOUGHTS. Would never act on it per patient.     History:   Recent stress: YES- Work related and self per pt  Prior depression hospitalization: None  Family history of depression: YES- Mom  Family history of anxiety: YES- Mom    Precipitating factors:   Alcohol/drug use: no    Alleviating factors:  Work and just thinking to self    Therapies Tried and outcome: Citalopram was given for anxiety in the past.      Has been on oral contraceptive pill and feels that is making things worse the past six months. Has been off celexa for anxiety for  A year. Has panic attacks and would like xanax again, used in the past sparingly.We discussed side effects and risks of this medication today including addiction, tolerance, increased sedation, respiratory depression, and possibly overdose if not taken as directed.   They will not mix this medication with alcohol or other sedating medications. They will not drive after taking this medication.  Patient states understanding. They verbally agreed to use their medication responsibly.     Was previously on oral contraceptive pill in high school, no issues then. Is on oral contraceptive pill for acne.     Went to yony be here for therapy before. But that therapist left right away.   Is interested in therapy again.         Problem list and histories reviewed &  adjusted, as indicated.  Additional history: as documented    Patient Active Problem List   Diagnosis     Anxiety     Adjustment disorder with mixed anxiety and depressed mood     ASCUS of cervix with negative high risk HPV     Past Surgical History:   Procedure Laterality Date     BACK SURGERY  2008    tailbone surgery     NO HISTORY OF SURGERY         Social History   Substance Use Topics     Smoking status: Never Smoker     Smokeless tobacco: Never Used     Alcohol use Yes      Comment: socially     Family History   Problem Relation Age of Onset     Breast Cancer No family hx of      mother grandmother         Current Outpatient Prescriptions   Medication Sig Dispense Refill     ALPRAZolam (XANAX) 0.5 MG tablet Take 1 tablet at onset of panic attack up to once daily as needed. Take No more than 3 times a week. 15 tablet 1     citalopram (CELEXA) 40 MG tablet Take 1/2 tablet (20 mg) for 1-2 weeks, then increase to 1 tablet orally daily 30 tablet 1     No Known Allergies    ROS:  Constitutional, HEENT, cardiovascular, pulmonary, GI, , musculoskeletal, neuro, skin, endocrine and psych systems are negative, except as otherwise noted.    OBJECTIVE:     /78  Pulse 93  Temp 100.2  F (37.9  C) (Oral)  Resp 14  Wt 144 lb (65.3 kg)  SpO2 99%  Breastfeeding? No  BMI 25.51 kg/m2  Body mass index is 25.51 kg/(m^2).  GENERAL: healthy, alert and no distress  RESP: lungs clear to auscultation - no rales, rhonchi or wheezes  CV: regular rate and rhythm, normal S1 S2, no S3 or S4, no murmur, click or rub, no peripheral edema and peripheral pulses strong  MS: no gross musculoskeletal defects noted, no edema  NEURO: Normal strength and tone, mentation intact and speech normal  PSYCH: mentation appears normal, affect normal/bright    Diagnostic Test Results:  none     ASSESSMENT/PLAN:     ASSESSMENT / PLAN:  (F41.9) Anxiety  (primary encounter diagnosis)  Comment: worsening, see below  Plan: citalopram (CELEXA) 40 MG  tablet, MENTAL HEALTH        REFERRAL  - Adult; Outpatient Treatment;         Individual/Couples/Family/Group Therapy/Health         Psychology; [facility]: [facility]         [phone]; We will contact you to schedule        the appointment or please call with any         questions, ALPRAZolam (XANAX) 0.5 MG tablet            (F41.0) Panic attack  Comment:   Plan: ALPRAZolam (XANAX) 0.5 MG tablet            Use condoms for birth control, patient prefers not to go on a different form      Patient Instructions   Stop oral contraceptive pill now  1)     Re-check with me in 3-4 weeks.  If doing really well, this can be via phone or F[employer].  Otherwise re-check in clinic in 2 months please.      Call with side effects or concerns.     Medication should start to work within 1-2 weeks but will not have full effect for about 6 weeks.     If medication makes you feel worse, stop right away and recheck with me.     If suicidal thoughts or plan occur, call 911 or go to emergency room.         2)  As your body allows, Try to start exercising see this article from [facility]:  Depression and anxiety: Exercise eases symptoms  Depression and anxiety symptoms often improve with exercise. Here are some realistic tips to help you get started and stay motivated.  By [facility] Staff   When you have depression or anxiety, exercise often seems like the last thing you want to do. But once you get motivated, exercise can make a big difference.  Exercise helps prevent and improve a number of health problems, including high blood pressure, diabetes and arthritis. Research on depression, anxiety and exercise shows that the psychological and physical benefits of exercise can also help improve mood and reduce anxiety.  The links between depression, anxiety and exercise aren't entirely clear -- but working out and other forms of physical activity can definitely ease symptoms of depression or anxiety and make you feel better.  "Exercise may also help keep depression and anxiety from coming back once you're feeling better.  How does exercise help depression and anxiety?  Regular exercise may help ease depression and anxiety by:  Releasing feel-good endorphins, natural cannabis-like brain chemicals (endogenous cannabinoids) and other natural brain chemicals that can enhance your sense of well-being   Taking your mind off worries so you can get away from the cycle of negative thoughts that feed depression and anxiety  Regular exercise has many psychological and emotional benefits, too. It can help you:  Gain confidence. Meeting exercise goals or challenges, even small ones, can boost your self-confidence. Getting in shape can also make you feel better about your appearance.   Get more social interaction. Exercise and physical activity may give you the chance to meet or socialize with others. Just exchanging a friendly smile or greeting as you walk around your neighborhood can help your mood.   [place] in a healthy way. Doing something positive to manage depression or anxiety is a healthy coping strategy. Trying to feel better by drinking alcohol, dwelling on how you feel, or hoping depression or anxiety will go away on its own can lead to worsening symptoms.  Is a structured exercise program the only option?  Some research shows that physical activity such as regular walking -- not just formal exercise programs -- may help improve mood. Physical activity and exercise are not the same thing, but both are beneficial to your health.  Physical activity is any activity that works your muscles and requires energy and can include work or household or leisure activities.   Exercise is a planned, structured and repetitive body movement done to improve or maintain physical fitness.  The word \"exercise\" may make you think of running laps around the gym. But exercise includes a wide range of activities that boost your activity level to help you feel " better.  Certainly running, lifting weights, playing basketball and other fitness activities that get your heart pumping can help. But so can physical activity such as gardening, washing your car, walking around the block or engaging in other less intense activities. Any physical activity that gets you off the couch and moving can help improve your mood.  You don't have to do all your exercise or other physical activity at once. Broaden how you think of exercise and find ways to add small amounts of physical activity throughout your day. For example, take the stairs instead of the elevator. Park a little farther away from work to fit in a short walk. Or, if you live close to your job, consider biking to work.        FREE AND CONFIDENTIAL 24/7 MENTAL HEALTH OR CRISIS HOTLINES:  BY [facility] you live in:      [place]    Adult  [phone]                     Child   [phone]      [facility] Adult [phone]                                             Child [phone]    [facility]             Adult [phone]                                            Child [phone]              Brenda Larry PA-C  [facility]

## 2018-05-25 ASSESSMENT — PATIENT HEALTH QUESTIONNAIRE - PHQ9: SUM OF ALL RESPONSES TO PHQ QUESTIONS 1-9: 22

## 2018-05-25 ASSESSMENT — ANXIETY QUESTIONNAIRES: GAD7 TOTAL SCORE: 20

## 2018-05-29 ENCOUNTER — TELEPHONE (OUTPATIENT)
Dept: FAMILY MEDICINE | Facility: CLINIC | Age: 28
End: 2018-05-29

## 2018-05-29 NOTE — TELEPHONE ENCOUNTER
Patient is calling for status on referral for therapy discussed last week. Please call to discuss. Thank you. Caller informed that calls received after 3pm may not be returned same day.

## 2018-07-02 ENCOUNTER — TELEPHONE (OUTPATIENT)
Dept: FAMILY MEDICINE | Facility: CLINIC | Age: 28
End: 2018-07-02

## 2018-07-02 NOTE — TELEPHONE ENCOUNTER
Took a week to kick in and start working and lasted 3 weeks  Last week increased depression and anxiety  Wondering if should increase dose or try something else now    To provider to advise    Kasandra Kwan BSN, RN, CPN

## 2018-07-02 NOTE — TELEPHONE ENCOUNTER
Per last note:  Re-check with me in 3-4 weeks.  If doing really well, this can be via phone or mychart.  Otherwise re-check in clinic in 2 months please.           She needs appt thanksgurvinder

## 2018-07-02 NOTE — TELEPHONE ENCOUNTER
Patient is calling to discuss poss changing RX: citalopram (CELEXA) 40 MG tablet dose. Please call back to discuss. Thank you.

## 2018-07-02 NOTE — PROGRESS NOTES
SUBJECTIVE:                                                    Charis Kruger is a 28 year old female who presents to clinic today for the following health issues:    Depression and Anxiety Follow-Up    Status since last visit: Stable but feels like its starting to get worst now    Other associated symptoms: Low appetite and low energy    Complicating factors:     Significant life event: No     Current substance abuse: None    PHQ-9 2/28/2017 5/24/2018   Total Score 10 22   Q9: Suicide Ideation Not at all Several days     NICOLASA-7 SCORE 1/17/2017 2/28/2017 5/24/2018   Total Score 8 12 20       PHQ-9  English  PHQ-9   Any Language  NICOLASA-7  Suicide Assessment Five-step Evaluation and Treatment (SAFE-T)    Amount of exercise or physical activity: 2-3 days/week for an average of 15-30 minutes    Problems taking medications regularly: No    Medication side effects: none    Diet: regular (no restrictions)      Was started on celexa 2 months ago. Had been previously on this.  But now feels depression symptoms are worsening as well as anxiety.    Has appt with therapist next Friday.  First appointment.     Xanax PRN-filling about 12 tabs/month. Uses sparingly.   Uses before bed if needed mostly as it makes her tired.  She is waking up in the middle of the night however and not sleeping very well.   [place] concerns.     Has crisis line, has used this before.  Has had suicidal ideation but no active plan. States she would never do that.     Started maddison at her physical and wondering if this has been making her mood worse. She wants to hold off on stopping or changing that right now but would be consideration in the future.     Nonsmoker. No illicit drugs.     We discussed side effects and risks of this medication today including addiction, tolerance, increased sedation, respiratory depression, and possibly overdose if not taken as directed.   They will not mix this medication with alcohol or other sedating  medications. They will not drive after taking this medication.  Patient states understanding. They verbally agreed to use their medication responsibly.         Problem list and histories reviewed & adjusted, as indicated.  Additional history: as documented    Patient Active Problem List   Diagnosis     Anxiety     Adjustment disorder with mixed anxiety and depressed mood     ASCUS of cervix with negative high risk HPV     Past Surgical History:   Procedure Laterality Date     BACK SURGERY  2008    tailbone surgery     NO HISTORY OF SURGERY         Social History   Substance Use Topics     Smoking status: Never Smoker     Smokeless tobacco: Never Used     Alcohol use Yes      Comment: socially     Family History   Problem Relation Age of Onset     Breast Cancer No family hx of      mother grandmother         Current Outpatient Prescriptions   Medication Sig Dispense Refill     ALPRAZolam (XANAX) 0.5 MG tablet Take 1 tablet at onset of panic attack up to once daily as needed. Take No more than 3 times a week. 15 tablet 1     clonazePAM (KLONOPIN) 0.5 MG tablet Take 0.5-1 tablets (0.25-0.5 mg) by mouth 2 times daily as needed for anxiety 20 tablet 1     FLUoxetine (PROZAC) 20 MG capsule Take 1 capsule daily for 1 week then take 2 capsules daily. 60 capsule 1     LORYNA 3-0.02 MG per tablet Take 1 tablet by mouth daily  2     No Known Allergies    ROS:  Constitutional, HEENT, cardiovascular, pulmonary, GI, , musculoskeletal, neuro, skin, endocrine and psych systems are negative, except as otherwise noted.    OBJECTIVE:     /71  Pulse 88  Temp 99.3  F (37.4  C) (Oral)  Resp 16  Wt 141 lb (64 kg)  SpO2 98%  Breastfeeding? No  BMI 24.98 kg/m2  Body mass index is 24.98 kg/(m^2).  GENERAL: healthy, alert and no distress  RESP: lungs clear to auscultation - no rales, rhonchi or wheezes  CV: regular rate and rhythm, normal S1 S2, no S3 or S4, no murmur, click or rub, no peripheral edema and peripheral pulses  strong  MS: no gross musculoskeletal defects noted, no edema  NEURO: Normal strength and tone, mentation intact and speech normal  PSYCH: mentation appears normal and tearful    Diagnostic Test Results:  none     ASSESSMENT/PLAN:   ASSESSMENT / PLAN:  (F41.9) Anxiety  (primary encounter diagnosis)  Comment: worsening, see below  Plan: clonazePAM (KLONOPIN) 0.5 MG tablet, FLUoxetine        (PROZAC) 20 MG capsule           (F43.23) Adjustment disorder with mixed anxiety and depressed mood  Comment: worsening, see below  Plan: clonazePAM (KLONOPIN) 0.5 MG tablet, FLUoxetine        (PROZAC) 20 MG capsule            Consider stopping/changing oral contraceptive pill in future also  Keep therapy appt  Consider psych in future if needed    To emergency room if suicidal thoughts/plan occur  Also use help line prn      Patient Instructions   Stop celexa and start prozac  Stop xanax and switch to clonopin  Follow up in 4-5 weeks, sooner if needed          Brenda Larry PA-C  [facility]

## 2018-07-05 ENCOUNTER — OFFICE VISIT (OUTPATIENT)
Dept: FAMILY MEDICINE | Facility: CLINIC | Age: 28
End: 2018-07-05
Payer: COMMERCIAL

## 2018-07-05 VITALS
WEIGHT: 141 LBS | BODY MASS INDEX: 24.98 KG/M2 | DIASTOLIC BLOOD PRESSURE: 71 MMHG | OXYGEN SATURATION: 98 % | SYSTOLIC BLOOD PRESSURE: 111 MMHG | HEART RATE: 88 BPM | TEMPERATURE: 99.3 F | RESPIRATION RATE: 16 BRPM

## 2018-07-05 DIAGNOSIS — F41.9 ANXIETY: Primary | ICD-10-CM

## 2018-07-05 DIAGNOSIS — F43.23 ADJUSTMENT DISORDER WITH MIXED ANXIETY AND DEPRESSED MOOD: ICD-10-CM

## 2018-07-05 PROCEDURE — 99214 OFFICE O/P EST MOD 30 MIN: CPT | Performed by: PHYSICIAN ASSISTANT

## 2018-07-05 RX ORDER — CLONAZEPAM 0.5 MG/1
0.25-0.5 TABLET ORAL 2 TIMES DAILY PRN
Qty: 20 TABLET | Refills: 1 | Status: SHIPPED | OUTPATIENT
Start: 2018-07-05 | End: 2018-09-27

## 2018-07-05 RX ORDER — DROSPIRENONE AND ETHINYL ESTRADIOL TABLETS 0.02-3(28)
1 KIT ORAL DAILY
Refills: 2 | COMMUNITY
Start: 2018-05-06 | End: 2018-09-27

## 2018-07-05 ASSESSMENT — ANXIETY QUESTIONNAIRES
IF YOU CHECKED OFF ANY PROBLEMS ON THIS QUESTIONNAIRE, HOW DIFFICULT HAVE THESE PROBLEMS MADE IT FOR YOU TO DO YOUR WORK, TAKE CARE OF THINGS AT HOME, OR GET ALONG WITH OTHER PEOPLE: EXTREMELY DIFFICULT
2. NOT BEING ABLE TO STOP OR CONTROL WORRYING: NEARLY EVERY DAY
5. BEING SO RESTLESS THAT IT IS HARD TO SIT STILL: NEARLY EVERY DAY
3. WORRYING TOO MUCH ABOUT DIFFERENT THINGS: NEARLY EVERY DAY
6. BECOMING EASILY ANNOYED OR IRRITABLE: NOT AT ALL
1. FEELING NERVOUS, ANXIOUS, OR ON EDGE: NEARLY EVERY DAY
GAD7 TOTAL SCORE: 18
7. FEELING AFRAID AS IF SOMETHING AWFUL MIGHT HAPPEN: NEARLY EVERY DAY

## 2018-07-05 ASSESSMENT — PATIENT HEALTH QUESTIONNAIRE - PHQ9: 5. POOR APPETITE OR OVEREATING: NEARLY EVERY DAY

## 2018-07-05 NOTE — PATIENT INSTRUCTIONS
Stop celexa and start prozac  Stop xanax and switch to clonopin  Follow up in 4-5 weeks, sooner if needed

## 2018-07-05 NOTE — MR AVS SNAPSHOT
After Visit Summary   7/5/2018    Charis Kruger    MRN: [id number]           Patient Information     Date Of Birth          1990        Visit Information        Provider Department      7/5/2018 9:40 AM Brenda Larry PA-C [facility]        Today's Diagnoses     Anxiety    -  1    Adjustment disorder with mixed anxiety and depressed mood          Care Instructions    Stop celexa and start prozac  Stop xanax and switch to clonopin  Follow up in 4-5 weeks, sooner if needed            Follow-ups after your visit        Your next 10 appointments already scheduled     Jul 13, 2018  9:00 AM CDT   (Arrive by 8:30 AM)   New Visit with Natividad Zarate [facility] ([facility]    [address] [facility] [address] [phone]            Jul 20, 2018  8:00 AM CDT   Return Visit with Natividad Zarate LPC   [facility] ([facility])    [address] [facility] [address] [phone]              Who to contact     If you have questions or need follow up information about today's clinic visit or your schedule please contact [facility] directly at [phone].  Normal or non-critical lab and imaging results will be communicated to you by MyChart, letter or phone within 4 business days after the clinic has received the results. If you do not hear from us within 7 days, please contact the clinic through MyChart or phone. If you have a critical or abnormal lab result, we will notify you by phone as soon as possible.  Submit refill requests through [employer] or call your pharmacy and they will forward the refill request to us. Please allow 3 business days for your refill to be completed.          Additional Information About Your Visit        Care EveryWhere ID     This is your Care EveryWhere ID. This could be used by other organizations to access your [facility]  records  [id number]        Your Vitals Were     Pulse Temperature Respirations Pulse Oximetry Breastfeeding? BMI (Body Mass Index)    88 99.3  F (37.4  C) (Oral) 16 98% No 24.98 kg/m2       Blood Pressure from Last 3 Encounters:   07/05/18 111/71   05/24/18 116/78   01/11/18 116/73    Weight from Last 3 Encounters:   07/05/18 141 lb (64 kg)   05/24/18 144 lb (65.3 kg)   01/11/18 147 lb (66.7 kg)              Today, you had the following     No orders found for display         Today's Medication Changes          These changes are accurate as of 7/5/18 10:07 AM.  If you have any questions, ask your nurse or doctor.               Start taking these medicines.        Dose/Directions    clonazePAM 0.5 MG tablet   Commonly known as:  klonoPIN   Used for:  Anxiety, Adjustment disorder with mixed anxiety and depressed mood   Started by:  Brenda Larry PA-C        Dose:  0.25-0.5 mg   Take 0.5-1 tablets (0.25-0.5 mg) by mouth 2 times daily as needed for anxiety   Quantity:  20 tablet   Refills:  1       FLUoxetine 20 MG capsule   Commonly known as:  PROzac   Used for:  Adjustment disorder with mixed anxiety and depressed mood, Anxiety   Started by:  Brenda Larry PA-C        Take 1 capsule daily for 1 week then take 2 capsules daily.   Quantity:  60 capsule   Refills:  1         Stop taking these medicines if you haven't already. Please contact your care team if you have questions.     citalopram 40 MG tablet   Commonly known as:  celeXA   Stopped by:  Brenda Larry PA-C                Where to get your medicines      These medications were sent to [place] [address] [place], [place] - [address] [facility]  [address] [facility] [address]     Phone:  [phone]     FLUoxetine 20 MG capsule         Some of these will need a paper prescription and others can be bought over the counter.  Ask your nurse if you have questions.     Bring a paper prescription for each of these  medications     clonazePAM 0.5 MG tablet                Primary Care Provider Office Phone # Fax #    Brenda Rin Larry PA-C [phone] [address] [facility] [address]        Equal Access to Services     DANIAL ROBERTSON : Hadmary miles ku hadgeetao Soomaali, waaxda luqadaha, qaybta kaalmada adeegyada, waxyissel humphrey hayyogeshn adeluis woodson laSuzettejosey . So [facility] [phone].    ATENCIÓN: Si habla español, tiene a hubbard disposición servicios gratuitos de asistencia lingüística. Llame al [phone].    We comply with applicable federal civil rights laws and [place] laws. We do not discriminate on the basis of race, color, national origin, age, disability, sex, sexual orientation, or gender identity.            Thank you!     Thank you for choosing [facility]  for your care. Our goal is always to provide you with excellent care. Hearing back from our patients is one way we can continue to improve our services. Please take a few minutes to complete the written survey that you may receive in the mail after your visit with us. Thank you!             Your Updated Medication List - Protect others around you: Learn how to safely use, store and throw away your medicines at www.disposemymeds.org.          This list is accurate as of 7/5/18 10:07 AM.  Always use your most recent med list.                   Brand Name Dispense Instructions for use Diagnosis    ALPRAZolam 0.5 MG tablet    XANAX    15 tablet    Take 1 tablet at onset of panic attack up to once daily as needed. Take No more than 3 times a week.    Panic attack, Anxiety       clonazePAM 0.5 MG tablet    klonoPIN    20 tablet    Take 0.5-1 tablets (0.25-0.5 mg) by mouth 2 times daily as needed for anxiety    Anxiety, Adjustment disorder with mixed anxiety and depressed mood       FLUoxetine 20 MG capsule    PROzac    60 capsule    Take 1 capsule daily for 1 week then take 2 capsules daily.    Adjustment disorder with mixed anxiety and  depressed mood, Anxiety       LORYNA 3-0.02 MG per tablet   Generic drug:  drospirenone-ethinyl estradiol      Take 1 tablet by mouth daily

## 2018-07-05 NOTE — NURSING NOTE
"Chief Complaint   Patient presents with     Depression     med check per pt also discuss possibly switching med      Anxiety     Health Maintenance     orders pended        Initial /71  Pulse 88  Temp 99.3  F (37.4  C) (Oral)  Resp 16  Wt 141 lb (64 kg)  SpO2 98%  Breastfeeding? No  BMI 24.98 kg/m2 Estimated body mass index is 24.98 kg/(m^2) as calculated from the following:    Height as of 1/11/18: 5' 3\" (1.6 m).    Weight as of this encounter: 141 lb (64 kg).  Medication Reconciliation: complete      Clari Montague MA    "

## 2018-07-06 ASSESSMENT — ANXIETY QUESTIONNAIRES: GAD7 TOTAL SCORE: 18

## 2018-07-06 ASSESSMENT — PATIENT HEALTH QUESTIONNAIRE - PHQ9: SUM OF ALL RESPONSES TO PHQ QUESTIONS 1-9: 27

## 2018-07-13 ENCOUNTER — OFFICE VISIT (OUTPATIENT)
Dept: PSYCHOLOGY | Facility: CLINIC | Age: 28
End: 2018-07-13
Attending: PHYSICIAN ASSISTANT
Payer: COMMERCIAL

## 2018-07-13 DIAGNOSIS — F41.1 GENERALIZED ANXIETY DISORDER: Primary | ICD-10-CM

## 2018-07-13 DIAGNOSIS — F32.89 OTHER DEPRESSIVE EPISODES: ICD-10-CM

## 2018-07-13 PROCEDURE — 90791 PSYCH DIAGNOSTIC EVALUATION: CPT | Performed by: COUNSELOR

## 2018-07-13 ASSESSMENT — ANXIETY QUESTIONNAIRES
5. BEING SO RESTLESS THAT IT IS HARD TO SIT STILL: MORE THAN HALF THE DAYS
IF YOU CHECKED OFF ANY PROBLEMS ON THIS QUESTIONNAIRE, HOW DIFFICULT HAVE THESE PROBLEMS MADE IT FOR YOU TO DO YOUR WORK, TAKE CARE OF THINGS AT HOME, OR GET ALONG WITH OTHER PEOPLE: SOMEWHAT DIFFICULT
2. NOT BEING ABLE TO STOP OR CONTROL WORRYING: SEVERAL DAYS
GAD7 TOTAL SCORE: 12
3. WORRYING TOO MUCH ABOUT DIFFERENT THINGS: MORE THAN HALF THE DAYS
7. FEELING AFRAID AS IF SOMETHING AWFUL MIGHT HAPPEN: SEVERAL DAYS
1. FEELING NERVOUS, ANXIOUS, OR ON EDGE: MORE THAN HALF THE DAYS
6. BECOMING EASILY ANNOYED OR IRRITABLE: MORE THAN HALF THE DAYS

## 2018-07-13 ASSESSMENT — PATIENT HEALTH QUESTIONNAIRE - PHQ9: 5. POOR APPETITE OR OVEREATING: MORE THAN HALF THE DAYS

## 2018-07-13 NOTE — MR AVS SNAPSHOT
MRN:[id number]                      After Visit Summary   7/13/2018    Charis Krugre    MRN: [id number]           Visit Information        Provider Department      7/13/2018 9:00 AM [facility]      Your next 10 appointments already scheduled     Jul 20, 2018  8:00 AM CDT   Return Visit with Natividad Zarate LPC   [facility] ([facility]    [address] [facility] [address] [phone]              Care EveryWhere ID     This is your Care EveryWhere ID. This could be used by other organizations to access your [facility] medical records  [id number]        Equal Access to Services     DANIAL ROBERTSON AH: Ragini Lewis, waninfada feroz, qalopez kaalmada eric, maryjo elam. So [facility] [phone].    ATENCIÓN: Si habla español, tiene a hubbard disposición servicios gratuitos de asistencia lingüística. Llame al [phone].    We comply with applicable federal civil rights laws and [place] laws. We do not discriminate on the basis of race, color, national origin, age, disability, sex, sexual orientation, or gender identity.

## 2018-07-13 NOTE — PROGRESS NOTES
Adult Intake Structured Interview  Standard Diagnostic Assessment      CLIENT'S NAME: Charis Kruger  MRN:   [id number]  :   1990  ACCT. NUMBER: [id number]  DATE OF SERVICE: 18      Identifying Information:  Client is a 28 year old, ,  female. Client was referred for counseling by Brenda Rose at [facility]. Client is currently employed full time. Client attended the session alone.       Client's Statement of Presenting Concern:  Client reports the reason for seeking therapy at this time as increased anxiety and depression symptoms.  Client stated that her symptoms have resulted in the following functional impairments: home life with , management of the household and or completion of tasks, relationship(s), self-care and work / vocational responsibilities      History of Presenting Concern:  Client reports that these problem(s) began early in college. Client has attempted to resolve these concerns in the past through medication and attending counseling. Client reports that other professional(s) are involved in providing support / services. She receives medication from her PCP.      Social History:  Client reported she grew up in [place], [place].   She is the youngest of two children. This is an intact family and parents remain . Client reported that her childhood was supportive. Client described her current relationships with family of origin as great.    Client reported a history of 1 committed relationships or marriages. Client has been  for 2 years. Client reported having no children. Charis reported that she engaged in an extra-marital affair with a co-worker, but ended the relationship, and told her  about the situation. She and her  are working on their  marriage together. Client identified some stable and meaningful social connections. Client reported that she has been involved with the legal system. Charis reported that she received a DUI a few years ago, and continues to have some guilt and shame regarding this. Client's highest education level was college graduate. Client did not identify any learning problems. There are no ethnic, cultural or Samaritan factors that may be relevant for therapy. Client identified her preferred language to be English. Client reported she does not need the assistance of an  or other support involved in therapy. Modifications will not be used to assist communication in therapy. Client did not serve in the .     Client reports family history is negative for Breast Cancer.    Mental Health History:  Client reported the following biological family members or relatives with mental health issues: Father experienced Anxiety, Mother experienced Anxiety, Maternal Grandmother experienced Anxiety and Sister experienced Anxiety.  Client previously received the following mental health diagnosis: Anxiety and Depression.  Client has received the following mental health services in the past: counseling and medication(s) from physician / PCP.  Hospitalizations: None.  Client is currently receiving the following services: medication(s) from physician / PCP.    Chemical Health History:  Client reported the following biological family members or relatives with chemical health issues: maternal family history. Client has not received chemical dependency treatment in the past. Client is not currently receiving any chemical dependency treatment. Client reported the following problems as a result of drinking: DWI in July 2016.     Client Reports:  Client reports using alcohol 1-2 times per month and has 1-2 beers at a time. Client first started drinking at age 23.  Client denies using tobacco.  Client denies using marijuana.  Client  reports drinking caffeine 1-2 times per week and has 1 cup at a time.    Client denies using street drugs.  Client denies the non-medical use of prescription or over the counter drugs.     CAGE: None of the patient's responses to the CAGE screening were positive / Negative CAGE score   Based on the negative Cage-Aid score and clinical interview there  are not indications of drug or alcohol abuse.     Discussed the general effects of drugs and alcohol on health and well-being.      Significant Losses / Trauma / Abuse / Neglect Issues:  There are no indications or report of: significant losses, trauma, abuse or neglect.     Issues of possible neglect are not present.     Medical Issues:  Client has had a physical exam to rule out medical causes for current symptoms. Date of last physical exam was within the past year. Client was encouraged to follow up with PCP if symptoms were to develop. The client has a [place] Primary Care Provider, who is named Brenda Larry. The client reports not having a psychiatrist. Client reports no current medical concerns. The client denies the presence of chronic or episodic pain. There are not significant nutritional concerns. She reported that she has had a decrease in appetite since the anxiety and depression increased.    Client reports current meds as:   Current Outpatient Prescriptions   Medication Sig     ALPRAZolam (XANAX) 0.5 MG tablet Take 1 tablet at onset of panic attack up to once daily as needed. Take No more than 3 times a week.     clonazePAM (KLONOPIN) 0.5 MG tablet Take 0.5-1 tablets (0.25-0.5 mg) by mouth 2 times daily as needed for anxiety     FLUoxetine (PROZAC) 20 MG capsule Take 1 capsule daily for 1 week then take 2 capsules daily.     LORYNA 3-0.02 MG per tablet Take 1 tablet by mouth daily     No current facility-administered medications for this visit.        Client Allergies:  No Known Allergies  no allergies to medications    Medical  History:  Past Medical History:   Diagnosis Date     Anxiety      ASCUS of cervix with negative high risk HPV 01/11/2018         Medication Adherence:  Client reports taking prescribed medications as prescribed.    Client was provided recommendation to follow-up with prescribing physician.    Mental Status Assessment:  Appearance:   Appropriate   Eye Contact:   Good   Psychomotor Behavior: Normal   Attitude:   Cooperative   Orientation:   All  Speech   Rate / Production: Normal    Volume:  Normal   Mood:    Normal Sad   Affect:    Appropriate   Thought Content:  Clear   Thought Form:  Coherent  Goal Directed  Logical   Insight:    Fair       Review of Symptoms:  Depression: Guilt Concentration Appetite Hopeless Helpless Worthless Ruminations  Carli:  No symptoms  Psychosis: No symptoms  Anxiety: Worries Nervousness  Panic:  Palpitations Tremors Shortness of Breath Sense of Impending Doom  Post Traumatic Stress Disorder: No symptoms  Obsessive Compulsive Disorder: No symptoms  Eating Disorder: No symptoms  Oppositional Defiant Disorder: No symptoms  ADD / ADHD: No symptoms  Conduct Disorder: No symptoms      Safety Assessment:    History of Safety Concerns:   Client denied a history of suicidal ideation.    Client denied a history of suicide attempts.    Client denied a history of homicidal ideation.    Client denied a history of self-injurious ideation and behaviors.    Client denied a history of personal safety concerns.    Client denied a history of assaultive behaviors.        Current Safety Concerns:  Client denies current suicidal ideation.    Client denies current homicidal ideation and behaviors.  Client denies current self-injurious ideation and behaviors.    Client denies current concerns for personal safety.    Client reports the following protective factors: spirituality, positive relationships positive social network and positive family connections, forward/future oriented thinking, dedication to  family/friends, safe and stable environment, regular physical activity, secure attachment, adherence with prescribed medication, living with other people, daily obligations, structured day, committment to well-being and access to a variety of clinical interventions    Client reports there are no firearms in the house.     Plan for Safety and Risk Management:  A safety and risk management plan has not been developed at this time, however client was given the after-hours number / 911 should there be a change in any of these risk factors.    Client's Strengths and Limitations:  Client identified the following strengths or resources that will help her succeed in counseling: Gnosticist, commitment to health and well being, shailesh / spirituality, friends / good social support, family support, intelligence and sense of humor. Client identified the following supports: family, Episcopal / spirituality and friends. Things that may interfere with the client's success in counseling include: none at this time.      Diagnostic Criteria:  A. Excessive anxiety and worry about a number of events or activities (such as work or school performance).   B. The person finds it difficult to control the worry.  C. Select 3 or more symptoms (required for diagnosis). Only one item is required in children.   - Restlessness or feeling keyed up or on edge.    - Being easily fatigued.    - Difficulty concentrating or mind going blank.    - Muscle tension.    - Sleep disturbance (difficulty falling or staying asleep, or restless unsatisfying sleep).   D. The focus of the anxiety and worry is not confined to features of an Axis I disorder.  E. The anxiety, worry, or physical symptoms cause clinically significant distress or impairment in social, occupational, or other important areas of functioning.   F. The disturbance is not due to the direct physiological effects of a substance (e.g., a drug of abuse, a medication) or a general medical condition  (e.g., hyperthyroidism) and does not occur exclusively during a Mood Disorder, a Psychotic Disorder, or a Pervasive Developmental Disorder.  Other specified depressive disorder      Functional Status:  Client's symptoms are causing reduced functional status in the following areas: Activities of Daily Living - completing tasks and chores around the house  Occupational / Vocational - looking for new employment  Social / Relational - working on marital relationship      DSM5 Diagnoses: (Sustained by DSM5 Criteria Listed Above)  Diagnoses: 311 (F32.8) Other/unspec. Depressive Disorder  300.02 (F41.1) Generalized Anxiety Disorder  Psychosocial & Contextual Factors: recent extramarital affair was disclosed, working on rebuilding trust in marriage, vocational stress  WHODAS 2.0 (12 item)            This questionnaire asks about difficulties due to health conditions. Health conditions  include  disease or illnesses, other health problems that may be short or long lasting,  injuries, mental health or emotional problems, and problems with alcohol or drugs.                     Think back over the past 30 days and answer these questions, thinking about how much  difficulty you had doing the following activities. For each question, please [place] only  one response.    S1 Standing for long periods such as 30 minutes? Mild =           2   S2 Taking care of household responsibilities? Severe =       4   S3 Learning a new task, for example, learning how to get to a new place? Moderate =   3   S4 How much of a problem do you have joining community activities (for example, festivals, Yazdanism or other activities) in the same way as anyone else can? Mild =           2   S5 How much have you been emotionally affected by your health problems? Extreme / or cannot do = 5     In the past 30 days, how much difficulty did you have in:   S6 Concentrating on doing something for ten minutes? Severe =       4   S7 Walking a long distance such as  a kilometer (or equivalent)? Moderate =   3   S8 Washing your whole body? Mild =           2   S9 Getting dressed? None =         1   S10 Dealing with people you do not know? Moderate =   3   S11 Maintaining a friendship? Mild =           2   S12 Your day to day work? Severe =       4     H1 Overall, in the past 30 days, how many days were these difficulties present? Record number of days 20   H2 In the past 30 days, for how many days were you totally unable to carry out your usual activities or work because of any health condition? Record number of days  5   H3 In the past 30 days, not counting the days that you were totally unable, for how many days did you cut back or reduce your usual activities or work because of any health condition? Record number of days 15     Attendance Agreement:  Client has signed Attendance Agreement:Yes      Collaboration:  The client is receiving treatment / structured support from the following professional(s) / service and treatment. Collaboration will be initiated with: primary care physician.      Preliminary Treatment Plan:  The client reports no currently identified Episcopal, ethnic or cultural issues relevant to therapy.     services are not indicated.    Modifications to assist communication are not indicated.    The concerns identified by the client will be addressed in therapy.    Initial Treatment will focus on: Depressed Mood - Decrease depressive symptoms  Anxiety - alleviate anxiety and panic  Relational Problems related to: Conflict or difficulties with partner/spouse.    As a preliminary treatment goal, client will experience a reduction in depressed mood, will develop more effective coping skills to manage depressive symptoms, will develop healthy cognitive patterns and beliefs, will increase ability to function adaptively and will continue to take medications as prescribed / participate in supportive activities and services , will experience a reduction in  anxiety, will develop more effective coping skills to manage anxiety symptoms, will develop healthy cognitive patterns and beliefs and will increase ability to function adaptively and will address relationship difficulties in a more adaptive manner.    The focus of initial interventions will be to alleviate anxiety, alleviate depressed mood, alleviate lability of mood, facilitate appropriate expression of feelings, increase coping skills, increase self esteem, increase trust, provide homework to reinforce skill development, teach communication skills, teach conflict management skills and teach stress mangement techniques.    Referral to another professional/service is not indicated at this time..    A Release of Information is not needed at this time.    Report to child / adult protection services was NA.    Client will have access to their [facility]' medical record.    Natividad Zarate, LPC  July 13, 2018

## 2018-07-13 NOTE — Clinical Note
Good morning Charis Gutierrez and I complete an intake session for individual therapy today. She has agreed to return for follow up sessions to work on decreasing anxiety, guilt, and depression. She is a very sweet individual and I look forward to working with her. Thank you for the referral. Please let me know if you have any questions or concerns.

## 2018-07-14 ASSESSMENT — PATIENT HEALTH QUESTIONNAIRE - PHQ9: SUM OF ALL RESPONSES TO PHQ QUESTIONS 1-9: 11

## 2018-07-14 ASSESSMENT — ANXIETY QUESTIONNAIRES: GAD7 TOTAL SCORE: 12

## 2018-07-17 NOTE — TELEPHONE ENCOUNTER
The patient was supposed to quit the celexa and start prozac.   This needs to be addressed by the prescribing provider.

## 2018-07-17 NOTE — TELEPHONE ENCOUNTER
Routing refill request to provider for review/approval because:  Drug not active on patient's medication list    KIRSTEN Mathur RN

## 2018-07-20 ENCOUNTER — OFFICE VISIT (OUTPATIENT)
Dept: PSYCHOLOGY | Facility: CLINIC | Age: 28
End: 2018-07-20
Attending: PHYSICIAN ASSISTANT
Payer: COMMERCIAL

## 2018-07-20 DIAGNOSIS — F32.89 OTHER DEPRESSIVE EPISODES: ICD-10-CM

## 2018-07-20 DIAGNOSIS — F41.1 GENERALIZED ANXIETY DISORDER: Primary | ICD-10-CM

## 2018-07-20 PROCEDURE — 90834 PSYTX W PT 45 MINUTES: CPT | Performed by: COUNSELOR

## 2018-07-20 NOTE — MR AVS SNAPSHOT
MRN:[id number]                      After Visit Summary   7/20/2018    Charis Kruger    MRN: [id number]           Visit Information        Provider Department      7/20/2018 8:00 AM [facility]ivid[facility] Zarate L[facility]      Your next 10 appointments already scheduled     Aug 13, 2018  7:00 AM CDT   Return Visit with Natividad Zarate LPC   [facility] ([facility])    [id number] [id number] [phone]              Care EveryWhere ID     This is your Care EveryWhere ID. This could be used by other organizations to access your [place] medical records  [id number]        Equal Access to Services     DANIAL ROBERTSON AH: Ragini Lewis, waninfada feroz, qatorresta kaalmada eric, maryjo elam. So [facility] [phone].    ATENCIÓN: Si habla español, tiene a hubbard disposición servicios gratuitos de asistencia lingüística. Llame al [phone].    We comply with applicable federal civil rights laws and [place] laws. We do not discriminate on the basis of race, color, national origin, age, disability, sex, sexual orientation, or gender identity.

## 2018-07-22 RX ORDER — CITALOPRAM HYDROBROMIDE 40 MG/1
TABLET ORAL
Qty: 30 TABLET | Refills: 0 | OUTPATIENT
Start: 2018-07-22

## 2018-07-25 NOTE — PROGRESS NOTES
Progress Note    Client Name: Charis Kruger  Date: 7/20/2018         Service Type: Individual      Session Start Time: 8:00am  Session End Time: 8:50am      Session Length: 50 minutes     Session #: 2     Attendees: Client attended alone    Treatment Plan Last Reviewed:   PHQ-9 / NICOLASA-7 : 7/13/2018     DATA      Progress Since Last Session (Related to Symptoms / Goals / Homework):   Symptoms: No change continuing to experience excessive guilt and shame around relationship with  and ex-coworker    Homework: Did not complete      Episode of Care Goals: No improvement - PREPARATION (Decided to change - considering how); Intervened by negotiating a change plan and determining options / strategies for behavior change, identifying triggers, exploring social supports, and working towards setting a date to begin behavior change     Current / Ongoing Stressors and Concerns:   Recently disclosed to her  and family that she engaged in an extra-marital affair with former co-worker. Continuing to experience guilt and shame, and feels that her  is standing by her and ready to move forward.      Treatment Objective(s) Addressed in This Session:   identify 2 fears / thoughts that contribute to feeling anxious  Decrease frequency and intensity of feeling down, depressed, hopeless  Identify negative self-talk and behaviors: challenge core beliefs, myths, and actions     Intervention:   CBT: Identifying automatic thoughts that she is having that are causing her stress and anxiety, and how to work on decreasing the negative self-talk thoughts and releasing some of the guilt and shame she is experiencing around the affair. Explored the nature of the affair in comparison to her high levels of guilt.        ASSESSMENT: Current Emotional / Mental Status (status of significant symptoms):   Risk status (Self / Other harm or suicidal ideation)   Client denies current  fears or concerns for personal safety.   Client denies current or recent suicidal ideation or behaviors.   Client denies current or recent homicidal ideation or behaviors.   Client denies current or recent self injurious behavior or ideation.   Client denies other safety concerns.   Client Client reports there has been no change in risk factors since their last session.     Client Client reports there has been no change in protective factors since their last session.     A safety and risk management plan has not been developed at this time, however client was given the after-hours number / 911 should there be a change in any of these risk factors.     Appearance:   Appropriate    Eye Contact:   Fair    Psychomotor Behavior: Normal    Attitude:   Cooperative    Orientation:   All   Speech    Rate / Production: Normal     Volume:  Normal    Mood:    Depressed  Sad    Affect:    Subdued  Worrisome    Thought Content:  Clear    Thought Form:  Coherent  Obsessive    Insight:    Fair      Medication Review:   No changes to current psychiatric medication(s)     Medication Compliance:   Yes     Changes in Health Issues:   None reported     Chemical Use Review:   Substance Use: Chemical use reviewed, no active concerns identified      Tobacco Use: No current tobacco use.       Collateral Reports Completed:   Not Applicable    PLAN: (Client Tasks / Therapist Tasks / Other)  Continue with individual therapy. Create treatment plan at next appointment        Natividad Zarate, [place]

## 2018-08-25 DIAGNOSIS — F43.23 ADJUSTMENT DISORDER WITH MIXED ANXIETY AND DEPRESSED MOOD: ICD-10-CM

## 2018-08-25 DIAGNOSIS — F41.9 ANXIETY: ICD-10-CM

## 2018-08-28 NOTE — TELEPHONE ENCOUNTER
Medication refilled per RN protocol.  APPT NEEDED FOR FURTHER REFILLS  Please help the pt schedule an appointment depression/anxiety.    Amanda Sosa RN

## 2018-09-24 DIAGNOSIS — F43.23 ADJUSTMENT DISORDER WITH MIXED ANXIETY AND DEPRESSED MOOD: ICD-10-CM

## 2018-09-24 DIAGNOSIS — F41.9 ANXIETY: ICD-10-CM

## 2018-09-24 NOTE — PROGRESS NOTES
SUBJECTIVE:                                                    Charis Kruger is a 28 year old female who presents to clinic today for the following health issues:    Depression Followup    Status since last visit: Improved     See PHQ-9 for current symptoms.  Other associated symptoms: Sleep issues? Per pt it has been a little off    Complicating factors:   Significant life event:  No   Current substance abuse:  None  Anxiety or Panic symptoms:  No    PHQ-9 5/24/2018 7/5/2018 7/13/2018   Total Score 22 27 11   Q9: Suicide Ideation Several days Nearly every day Several days   F/U: Thoughts of suicide or self-harm - - No   F/U: Safety concerns - - No       PHQ-9  English  PHQ-9   Any Language  Suicide Assessment Five-step Evaluation and Treatment (SAFE-T)    Amount of exercise or physical activity: 2-3 days/week for an average of 15-30 minutes    Problems taking medications regularly: No    Medication side effects: none    Diet: regular (no restrictions)      Anxiety improving but feels an increase might help more. Has been on 40 mg prozac.  Would like to try 60 mg. No side effects.   Is seeing a therapist but they are on maternity leave.   Has found this helpful.   Sometimes has issues with sleep. Exercise helps her sleep more. Has tried melatonin.     Denies suicidal or homicidal thoughts.  Patient instructed to go to the emergency room or call 911 if these occur.    Uses clonopin sparingly for panic attacks. Takes half tab usually[place] concerns.         Problem list and histories reviewed & adjusted, as indicated.  Additional history: as documented    Patient Active Problem List   Diagnosis     Anxiety     Adjustment disorder with mixed anxiety and depressed mood     ASCUS of cervix with negative high risk HPV     Past Surgical History:   Procedure Laterality Date     BACK SURGERY  2008    tailbone surgery     NO HISTORY OF SURGERY         Social History   Substance Use Topics     Smoking status:  Never Smoker     Smokeless tobacco: Never Used     Alcohol use Yes      Comment: socially     Family History   Problem Relation Age of Onset     Anxiety Disorder Mother      Anxiety Disorder Father      Anxiety Disorder Maternal Grandmother      Anxiety Disorder Sister      Breast Cancer No family hx of      mother grandmother         Current Outpatient Prescriptions   Medication Sig Dispense Refill     clonazePAM (KLONOPIN) 0.5 MG tablet Take 0.5-1 tablets (0.25-0.5 mg) by mouth 2 times daily as needed for anxiety 20 tablet 1     FLUoxetine (PROZAC) 20 MG capsule Take 3 capsules (60 mg) by mouth daily 90 capsule 5     [DISCONTINUED] clonazePAM (KLONOPIN) 0.5 MG tablet Take 0.5-1 tablets (0.25-0.5 mg) by mouth 2 times daily as needed for anxiety 20 tablet 1     [DISCONTINUED] FLUoxetine (PROZAC) 20 MG capsule Take 2 capsules (40 mg) by mouth daily APPT NEEDED FOR FURTHER REFILLS 60 capsule 0     No Known Allergies    ROS:  Constitutional, HEENT, cardiovascular, pulmonary, GI, , musculoskeletal, neuro, skin, endocrine and psych systems are negative, except as otherwise noted.    OBJECTIVE:     /73  Pulse 84  Temp 98.8  F (37.1  C) (Oral)  Resp 16  Wt 151 lb (68.5 kg)  SpO2 98%  Breastfeeding? No  BMI 26.75 kg/m2  Body mass index is 26.75 kg/(m^2).  GENERAL: healthy, alert and no distress  RESP: lungs clear to auscultation - no rales, rhonchi or wheezes  CV: regular rate and rhythm, normal S1 S2, no S3 or S4, no murmur, click or rub, no peripheral edema and peripheral pulses strong  MS: no gross musculoskeletal defects noted, no edema  PSYCH: mentation appears normal, affect normal/bright    ASSESSMENT/PLAN:     ASSESSMENT / PLAN:  (F41.9) Anxiety  (primary encounter diagnosis)  Comment: improving but patient feels she could have even more improvement  Plan: FLUoxetine (PROZAC) 20 MG capsule, clonazePAM         (KLONOPIN) 0.5 MG tablet            (F43.23) Adjustment disorder with mixed anxiety and  depressed mood  Comment:   Plan: clonazePAM (KLONOPIN) 0.5 MG tablet          We discussed side effects and risks of this medication today including addiction, tolerance, increased sedation, respiratory depression, and possibly overdose if not taken as directed.   They will not mix this medication with alcohol or other sedating medications. They will not drive after taking this medication.  Patient states understanding. They verbally agreed to use their medication responsibly.     See below  ,Billin min spent face-to-face with patient. 15 min on history, 1 on exam, 9 on discussing diagnosis and treatment plan.       Would need appt for any more clonopin    Patient Instructions   Try yoga or meditation, even free ones on you tube can be good  Increase prozac   Take clonopin as needed but stay at taking it only sparingly  Never mix it with alcohol  Recheck 3 months, if doing well this can be through phone, if not doing well recheck in person          Brenda Larry PA-C  [facility]

## 2018-09-25 NOTE — TELEPHONE ENCOUNTER
Fluoxetine refill request  Last refill: 8/28/18 x 1 month  Has pending appointment with MAXIMUS Larry PA-C for follow up on 9/27/18.

## 2018-09-27 ENCOUNTER — OFFICE VISIT (OUTPATIENT)
Dept: FAMILY MEDICINE | Facility: CLINIC | Age: 28
End: 2018-09-27
Payer: COMMERCIAL

## 2018-09-27 VITALS
DIASTOLIC BLOOD PRESSURE: 73 MMHG | TEMPERATURE: 98.8 F | SYSTOLIC BLOOD PRESSURE: 110 MMHG | RESPIRATION RATE: 16 BRPM | OXYGEN SATURATION: 98 % | HEART RATE: 84 BPM | WEIGHT: 151 LBS | BODY MASS INDEX: 26.75 KG/M2

## 2018-09-27 DIAGNOSIS — F41.9 ANXIETY: ICD-10-CM

## 2018-09-27 DIAGNOSIS — F41.9 ANXIETY: Primary | ICD-10-CM

## 2018-09-27 DIAGNOSIS — F43.23 ADJUSTMENT DISORDER WITH MIXED ANXIETY AND DEPRESSED MOOD: ICD-10-CM

## 2018-09-27 PROCEDURE — 99214 OFFICE O/P EST MOD 30 MIN: CPT | Performed by: PHYSICIAN ASSISTANT

## 2018-09-27 RX ORDER — CLONAZEPAM 0.5 MG/1
0.25-0.5 TABLET ORAL 2 TIMES DAILY PRN
Qty: 20 TABLET | Refills: 1 | Status: SHIPPED | OUTPATIENT
Start: 2018-09-27 | End: 2019-01-28

## 2018-09-27 NOTE — MR AVS SNAPSHOT
After Visit Summary   9/27/2018    Charis Kruger    MRN: [id number]           Patient Information     Date Of Birth          1990        Visit Information        Provider Department      9/27/2018 8:20 AM Brenda Larry PA-C [facility]        Today's Diagnoses     Anxiety    -  1    Adjustment disorder with mixed anxiety and depressed mood          Care Instructions    Try yoga or meditation, even free ones on you tube can be good  Increase prozac   Take clonopin as needed but stay at taking it only sparingly  Never mix it with alcohol  Recheck 3 months, if doing well this can be through phone, if not doing well recheck in person            Follow-ups after your visit        Who to contact     If you have questions or need follow up information about today's clinic visit or your schedule please contact [facility] directly at [phone].  Normal or non-critical lab and imaging results will be communicated to you by MyChart, letter or phone within 4 business days after the clinic has received the results. If you do not hear from us within 7 days, please contact the clinic through MyChart or phone. If you have a critical or abnormal lab result, we will notify you by phone as soon as possible.  Submit refill requests through [employer] or call your pharmacy and they will forward the refill request to us. Please allow 3 business days for your refill to be completed.          Additional Information About Your Visit        Care EveryWhere ID     This is your Care EveryWhere ID. This could be used by other organizations to access your [facility] medical records  [id number]        Your Vitals Were     Pulse Temperature Respirations Pulse Oximetry Breastfeeding? BMI (Body Mass Index)    84 98.8  F (37.1  C) (Oral) 16 98% No 26.75 kg/m2       Blood Pressure from Last 3 Encounters:   09/27/18 110/73   07/05/18 111/71   05/24/18 116/78    Weight from Last 3  Encounters:   09/27/18 151 lb (68.5 kg)   07/05/18 141 lb (64 kg)   05/24/18 144 lb (65.3 kg)              Today, you had the following     No orders found for display         Today's Medication Changes          These changes are accurate as of 9/27/18  9:13 AM.  If you have any questions, ask your nurse or doctor.               These medicines have changed or have updated prescriptions.        Dose/Directions    FLUoxetine 20 MG capsule   Commonly known as:  PROzac   This may have changed:    - how much to take  - additional instructions   Used for:  Anxiety   Changed by:  Brenda Larry PA-C        Dose:  60 mg   Take 3 capsules (60 mg) by mouth daily   Quantity:  90 capsule   Refills:  5            Where to get your medicines      These medications [place] [facility] [place] - [address]  [address] [place] [address]     Phone:  [phone]     FLUoxetine 20 MG capsule         Some of these will need a paper prescription and others can be bought over the counter.  Ask your nurse if you have questions.     Bring a paper prescription for each of these medications     clonazePAM 0.5 MG tablet                Primary Care Provider Office Phone # Fax #    Brenda Larry PA-C [phone] [address] [address]        Equal Access to Services     GUNNER ROBERTSON : Hadii miles ku hadasho Soomaali, waaxda luqadaha, qaybta kaalmada adeegyada, maryjo elam. So [facility] [phone].    ATENCIÓN: Si habla español, tiene a hubbard disposición servicios gratuitos de asistencia lingüística. Estevan al [phone].    We comply with applicable federal civil rights laws and [place] laws. We do not discriminate on the basis of race, color, national origin, age, disability, sex, sexual orientation, or gender identity.            Thank you!     Thank you for choosing [facility]  for your  care. Our goal is always to provide you with excellent care. Hearing back from our patients is one way we can continue to improve our services. Please take a few minutes to complete the written survey that you may receive in the mail after your visit with us. Thank you!             Your Updated Medication List - Protect others around you: Learn how to safely use, store and throw away your medicines at www.disposemymeds.org.          This list is accurate as of 9/27/18  9:13 AM.  Always use your most recent med list.                   Brand Name Dispense Instructions for use Diagnosis    clonazePAM 0.5 MG tablet    klonoPIN    20 tablet    Take 0.5-1 tablets (0.25-0.5 mg) by mouth 2 times daily as needed for anxiety    Anxiety, Adjustment disorder with mixed anxiety and depressed mood       FLUoxetine 20 MG capsule    PROzac    90 capsule    Take 3 capsules (60 mg) by mouth daily    Anxiety

## 2018-09-27 NOTE — TELEPHONE ENCOUNTER
Prescription approved per [facility] Refill Protocol.  Filled to get through until Follow up due in Dec 2018, notified via patient sig to follow up.    Susy Virk RN on 9/27/2018 at 2:51 PM

## 2018-09-27 NOTE — LETTER
My Depression Action Plan  Name: Charis Kruger   Date of Birth 1990  Date: 9/24/2018    My doctor: Brenda Larry   My clinic: [facility]  [id number] [place] [id number] [phone]          GREEN    ZONE   Good Control    What it looks like:     Things are going generally well. You have normal up s and down s. You may even feel depressed from time to time, but bad moods usually last less than a day.   What you need to do:  1. Continue to care for yourself (see self care plan)  2. Check your depression survival kit and update it as needed  3. Follow your physician s recommendations including any medication.  4. Do not stop taking medication unless you consult with your physician first.           YELLOW         ZONE Getting Worse    What it looks like:     Depression is starting to interfere with your life.     It may be hard to get out of bed; you may be starting to isolate yourself from others.    Symptoms of depression are starting to last most all day and this has happened for several days.     You may have suicidal thoughts but they are not constant.   What you need to do:     1. Call your care team, your response to treatment will improve if you keep your care team informed of your progress. Yellow periods are signs an adjustment may need to be made.     2. Continue your self-care, even if you have to fake it!    3. Talk to someone in your support network    4. Open up your depression survival kit           RED    ZONE Medical Alert - Get Help    What it looks like:     Depression is seriously interfering with your life.     You may experience these or other symptoms: You can t get out of bed most days, can t work or engage in other necessary activities, you have trouble taking care of basic hygiene, or basic responsibilities, thoughts of suicide or death that will not go away, self-injurious behavior.     What you need to do:  1. Call your care  team and request a same-day appointment. If they are not available (weekends or after hours) call your local crisis line, emergency room or 911.            Depression Self Care Plan / Survival Kit    Self-Care for Depression  Here s the deal. Your body and mind are really not as separate as most people think.  What you do and think affects how you feel and how you feel influences what you do and think. This means if you do things that people who feel good do, it will help you feel better.  Sometimes this is all it takes.  There is also a place for medication and therapy depending on how severe your depression is, so be sure to consult with your medical provider and/ or Behavioral Health Consultant if your symptoms are worsening or not improving.     In order to better manage my stress, I will:    Exercise  Get some form of exercise, every day. This will help reduce pain and release endorphins, the  feel good  chemicals in your brain. This is almost as good as taking antidepressants!  This is not the same as joining a gym and then never going! (they count on that by the way ) It can be as simple as just going for a walk or doing some gardening, anything that will get you moving.      Hygiene   Maintain good hygiene (Get out of bed in the morning, Make your bed, Brush your teeth, Take a shower, and Get dressed like you were going to work, even if you are unemployed).  If your clothes don't fit try to get ones that do.    Diet  I will strive to eat foods that are good for me, drink plenty of water, and avoid excessive sugar, caffeine, alcohol, and other mood-altering substances.  Some foods that are helpful in depression are: complex carbohydrates, B vitamins, flaxseed, fish or fish oil, fresh fruits and vegetables.    Psychotherapy  I agree to participate in Individual Therapy (if recommended).    Medication  If prescribed medications, I agree to take them.  Missing doses can result in serious side effects.  I  understand that drinking alcohol, or other illicit drug use, may cause potential side effects.  I will not stop my medication abruptly without first discussing it with my provider.    Staying Connected With Others  I will stay in touch with my friends, family members, and my primary care provider/team.    Use your imagination  Be creative.  We all have a creative side; it doesn t matter if it s oil painting, sand castles, or mud pies! This will also kick up the endorphins.    Witness Beauty  (AKA stop and smell the roses) Take a look outside, even in mid-winter. Notice colors, textures. Watch the squirrels and birds.     Service to others  Be of service to others.  There is always someone else in need.  By helping others we can  get out of ourselves  and remember the really important things.  This also provides opportunities for practicing all the other parts of the program.    Humor  Laugh and be silly!  Adjust your TV habits for less news and crime-drama and more comedy.    Control your stress  Try breathing deep, massage therapy, biofeedback, and meditation. Find time to relax each day.     My support system    Clinic Contact:  Phone number:    Contact 1:  Phone number:    Contact 2:  Phone number:    Anabaptism/:  Phone number:    Therapist:  Phone number:    Local crisis center:    Phone number:    Other community support:  Phone number:

## 2018-09-27 NOTE — NURSING NOTE
"Chief Complaint   Patient presents with     Depression     Fluoxetine med refill     Health Maintenance     orders pended       Initial /73  Pulse 84  Temp 98.8  F (37.1  C) (Oral)  Resp 16  Wt 151 lb (68.5 kg)  SpO2 98%  Breastfeeding? No  BMI 26.75 kg/m2 Estimated body mass index is 26.75 kg/(m^2) as calculated from the following:    Height as of 1/11/18: 5' 3\" (1.6 m).    Weight as of this encounter: 151 lb (68.5 kg).  Medication Reconciliation: complete      Clari Montague MA    "

## 2018-09-27 NOTE — PATIENT INSTRUCTIONS
Try yoga or meditation, even free ones on you tube can be good  Increase prozac   Take clonopin as needed but stay at taking it only sparingly  Never mix it with alcohol  Recheck 3 months, if doing well this can be through phone, if not doing well recheck in person

## 2018-11-30 DIAGNOSIS — F43.23 ADJUSTMENT DISORDER WITH MIXED ANXIETY AND DEPRESSED MOOD: ICD-10-CM

## 2018-11-30 DIAGNOSIS — F41.9 ANXIETY: ICD-10-CM

## 2019-01-22 DIAGNOSIS — F43.23 ADJUSTMENT DISORDER WITH MIXED ANXIETY AND DEPRESSED MOOD: ICD-10-CM

## 2019-01-22 DIAGNOSIS — F41.9 ANXIETY: ICD-10-CM

## 2019-01-22 RX ORDER — CLONAZEPAM 0.5 MG/1
0.25-0.5 TABLET ORAL 2 TIMES DAILY PRN
Qty: 20 TABLET | Refills: 1 | OUTPATIENT
Start: 2019-01-22

## 2019-01-22 NOTE — TELEPHONE ENCOUNTER
Controlled Substance Refill Request for Clonazepam  Problem List Complete:  No     PROVIDER TO CONSIDER COMPLETION OF PROBLEM LIST AND OVERVIEW/CONTROLLED SUBSTANCE AGREEMENT    Last Written Prescription Date:  9/27/18  Last Fill Quantity: 20,   # refills: 1    THE MOST RECENT OFFICE VISIT MUST BE WITHIN THE PAST 3 MONTHS. (AT LEAST ONE FACE TO FACE VISIT MUST OCCUR EVERY 6 MONTHS. ADDITIONAL VISITS CAN BE VIRTUAL.)    Last Office Visit with [facility] provider: 9/27/18    Controlled substance agreement: [unfilled]    Last Urine Drug Screen: No results found for: CDAUT, No results found for: COMDAT, No results found for: THC13, PCP13, COC13, MAMP13, OPI13, AMP13, BZO13, TCA13, MTD13, BAR13, OXY13, PPX13, BUP13          [email or web address]       checked in past 3 months?  UNABLE TO VIEW DUE TO PROVIDER HAS NOT ADDED DELEGATE IN         Kasandra BECERRAN, RN, CPN

## 2019-01-22 NOTE — TELEPHONE ENCOUNTER
See my last notes please.     They say the answer to this.     I will paste it for you       Would need appt for any more clonopin     Patient Instructions   Try yoga or meditation, even free ones on you tube can be good  Increase prozac   Take clonopin as needed but stay at taking it only sparingly  Never mix it with alcohol  Recheck 3 months, if doing well this can be through phone, if not doing well recheck in person      Brenda
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
## 2019-01-23 NOTE — PROGRESS NOTES
SUBJECTIVE:                                                    Charis Kruger is a 28 year old female who presents to clinic today for the following health issues:      Telephone visit:      Anxiety Follow-Up    Status since last visit: Improved     Other associated symptoms:None    Complicating factors:   Significant life event: No   Current substance abuse: None  Depression symptoms: Yes-  Little but not as bad  NICOLASA-7 SCORE 5/24/2018 7/5/2018 7/13/2018   Total Score 20 18 12       NICOLASA-7    Amount of exercise or physical activity: 2-3 days/week for an average of 30-45 minutes    Problems taking medications regularly: No    Medication side effects: none    Diet: regular (no restrictions)    Last visit we increased prozac from 40 to 60 mg and this is working very well for her.   ph9 is 2 and nicolasa is also very good.   Anxiety is the best its ever been.   Clonopin-using about 1-2 times a week PRN. Usually at night.   We discussed side effects and risks of this medication today including addiction, tolerance, increased sedation, respiratory depression, and possibly overdose if not taken as directed.   They will not mix this medication with alcohol or other sedating medications. They will not drive after taking this medication.  Patient states understanding. They verbally agreed to use their medication responsibly.       ASSESSMENT / PLAN:      (F41.9) Anxiety  Comment: improved  Plan: clonazePAM (KLONOPIN) 0.5 MG tablet            (F43.23) Adjustment disorder with mixed anxiety and depressed mood  Comment:   Plan: clonazePAM (KLONOPIN) 0.5 MG tablet          Recheck in person for more clonopin  Otherwise recheck in 6 months ok to refill prozac until then, she does not need more right now    11 minutes was spent on the phone with patient discussing symptoms and medication.

## 2019-01-28 ENCOUNTER — VIRTUAL VISIT (OUTPATIENT)
Dept: FAMILY MEDICINE | Facility: CLINIC | Age: 29
End: 2019-01-28

## 2019-01-28 DIAGNOSIS — F43.23 ADJUSTMENT DISORDER WITH MIXED ANXIETY AND DEPRESSED MOOD: ICD-10-CM

## 2019-01-28 DIAGNOSIS — F41.9 ANXIETY: Primary | ICD-10-CM

## 2019-01-28 PROCEDURE — 99442 ZZC PHYSICIAN TELEPHONE EVALUATION 11-20 MIN: CPT | Performed by: PHYSICIAN ASSISTANT

## 2019-01-28 RX ORDER — CLONAZEPAM 0.5 MG/1
0.25-0.5 TABLET ORAL 2 TIMES DAILY PRN
Qty: 20 TABLET | Refills: 1 | Status: SHIPPED | OUTPATIENT
Start: 2019-01-28 | End: 2019-07-09

## 2019-01-28 ASSESSMENT — ANXIETY QUESTIONNAIRES
3. WORRYING TOO MUCH ABOUT DIFFERENT THINGS: SEVERAL DAYS
6. BECOMING EASILY ANNOYED OR IRRITABLE: NOT AT ALL
5. BEING SO RESTLESS THAT IT IS HARD TO SIT STILL: NOT AT ALL
GAD7 TOTAL SCORE: 3
7. FEELING AFRAID AS IF SOMETHING AWFUL MIGHT HAPPEN: NOT AT ALL
IF YOU CHECKED OFF ANY PROBLEMS ON THIS QUESTIONNAIRE, HOW DIFFICULT HAVE THESE PROBLEMS MADE IT FOR YOU TO DO YOUR WORK, TAKE CARE OF THINGS AT HOME, OR GET ALONG WITH OTHER PEOPLE: SOMEWHAT DIFFICULT
2. NOT BEING ABLE TO STOP OR CONTROL WORRYING: NOT AT ALL
1. FEELING NERVOUS, ANXIOUS, OR ON EDGE: SEVERAL DAYS

## 2019-01-28 ASSESSMENT — PATIENT HEALTH QUESTIONNAIRE - PHQ9
5. POOR APPETITE OR OVEREATING: SEVERAL DAYS
SUM OF ALL RESPONSES TO PHQ QUESTIONS 1-9: 2

## 2019-01-29 ENCOUNTER — OFFICE VISIT (OUTPATIENT)
Dept: PSYCHOLOGY | Facility: CLINIC | Age: 29
End: 2019-01-29
Payer: COMMERCIAL

## 2019-01-29 DIAGNOSIS — F41.1 GENERALIZED ANXIETY DISORDER: Primary | ICD-10-CM

## 2019-01-29 DIAGNOSIS — F43.23 ADJUSTMENT DISORDER WITH MIXED ANXIETY AND DEPRESSED MOOD: ICD-10-CM

## 2019-01-29 PROCEDURE — 90834 PSYTX W PT 45 MINUTES: CPT | Performed by: COUNSELOR

## 2019-01-29 ASSESSMENT — ANXIETY QUESTIONNAIRES: GAD7 TOTAL SCORE: 3
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
## 2019-02-05 ENCOUNTER — OFFICE VISIT (OUTPATIENT)
Dept: PSYCHOLOGY | Facility: CLINIC | Age: 29
End: 2019-02-05
Payer: COMMERCIAL

## 2019-02-05 DIAGNOSIS — F41.1 GENERALIZED ANXIETY DISORDER: Primary | ICD-10-CM

## 2019-02-05 PROCEDURE — 90834 PSYTX W PT 45 MINUTES: CPT | Performed by: COUNSELOR

## 2019-02-05 NOTE — PROGRESS NOTES
Progress Note    Client Name: Charis Kruger  Date: 2/5/2019           Service Type: Individual      Session Start Time: 10:30am  Session End Time: 11:20am      Session Length: 50 minutes     Session #: 4     Attendees: Client attended alone    Treatment Plan Last Reviewed: 2/5/2019  PHQ-9 / NICOLASA-7 :      DATA      Progress Since Last Session (Related to Symptoms / Goals / Homework):   Symptoms: No change still contemplating whether to stay with . Continued depression and anxiety    Homework: Partially completed      Episode of Care Goals: Minimal progress - PREPARATION (Decided to change - considering how); Intervened by negotiating a change plan and determining options / strategies for behavior change, identifying triggers, exploring social supports, and working towards setting a date to begin behavior change     Current / Ongoing Stressors and Concerns:   Unhappy in her marriage and wants to explore her options regarding staying in her marriage versus leaving marriage.      Treatment Objective(s) Addressed in This Session:   exploring dynamics in marriage and reasons to stay/leave  Treatment planning     Intervention:   DBT: Pros and cons. Explored different pros and cons about staying in marriage versus leaving marriage.         ASSESSMENT: Current Emotional / Mental Status (status of significant symptoms):   Risk status (Self / Other harm or suicidal ideation)   Client denies current fears or concerns for personal safety.   Client denies current or recent suicidal ideation or behaviors.   Client denies current or recent homicidal ideation or behaviors.   Client denies current or recent self injurious behavior or ideation.   Client denies other safety concerns.   Client Client reports there has been no change in risk factors since their last session.     Client Client reports there has been no change in protective factors since their last session.     A  safety and risk management plan has not been developed at this time, however client was given the after-hours number / 911 should there be a change in any of these risk factors.     Appearance:   Appropriate    Eye Contact:   Good    Psychomotor Behavior: Normal    Attitude:   Cooperative    Orientation:   All   Speech    Rate / Production: Normal     Volume:  Normal    Mood:    Normal   Affect:    Appropriate    Thought Content:  Clear    Thought Form:  Coherent  Logical    Insight:    Good      Medication Review:   No changes to current psychiatric medication(s)     Medication Compliance:   Yes     Changes in Health Issues:   None reported     Chemical Use Review:   Substance Use: Chemical use reviewed, no active concerns identified      Tobacco Use: No current tobacco use.       Collateral Reports Completed:   Not Applicable    PLAN: (Client Tasks / Therapist Tasks / Other)  Continue with individual therapy        Natividad Zarate, [facility]                                                         ________________________________________________________________________    Treatment Plan    Client's Name: Charis Kruger  YOB: 1990    Date: 2/5/2019    DSM-V Diagnoses: 300.02 (F41.1) Generalized Anxiety Disorder  Psychosocial / Contextual Factors: marital discord  WHODAS: 35    Referral / Collaboration:  Referral to another professional/service is not indicated at this time..    Anticipated number of session or this episode of care: 18-20      MeasurableTreatment Goal(s) related to diagnosis / functional impairment(s)  Goal 1: Client will evaluate pros and cons of staying in current marriage.    Objective #A (Client Action)    Client will assess gains and losses from leaving marriage.  Status: New - Date: 2/5/2019     Intervention(s)  Therapist will teach DBT skills pros and cons.    Objective #B  Client will assess gains and losses from staying in marriage.  Status: New - Date: 2/5/2019      Intervention(s)  Therapist will teach DBT pros and cons.    Goal 2: Client will explore anxiety symptoms related to change.    Objective #A (Client Action)    Status: New - Date: 2/5/2019     Client will use cognitive strategies identified in therapy to challenge anxious thoughts.    Intervention(s)  Therapist will teach thought stopping strategies.    Objective #B  Client will learn how thoughts, actions, and feelings are related to decision making.    Status: New - Date: 2/5/2019     Intervention(s)  Therapist will teach behavioral chaining.    Goal 3: Client will gain confidence in her decision making skills.    Objective #A (Client Action)    Status: New - Date: 2/5/2019     Client will identify what causes her lack of confidence in executing decisions.    Intervention(s)  Therapist will assign homework to improve assertiveness.    Objective #B  Client will learn & utilize at least 3 assertive communication skills weekly.    Status: New - Date: 2/5/2019     Intervention(s)  Therapist will role-play effective communication skills.    Objective #C  Client will improve overall confidence and self-assurance.  Status: New - Date: 2/5/2019     Intervention(s)  Therapist will role-play assertiveness skills.      Client has not reviewed nor agreed to the above plan.      Natividad Zarate, LPC  February 5, 2019

## 2019-02-14 ENCOUNTER — OFFICE VISIT (OUTPATIENT)
Dept: PSYCHOLOGY | Facility: CLINIC | Age: 29
End: 2019-02-14
Payer: COMMERCIAL

## 2019-02-14 DIAGNOSIS — F41.1 GENERALIZED ANXIETY DISORDER: Primary | ICD-10-CM

## 2019-02-14 PROCEDURE — 90834 PSYTX W PT 45 MINUTES: CPT | Performed by: COUNSELOR

## 2019-02-14 NOTE — PROGRESS NOTES
Progress Note    Client Name: Charis Kruger  Date: 2/14/2019         Service Type: Individual  Video Visit: No     Session Start Time: 10:30a  Session End Time: 11:20a     Session Length: 50 minutes    Session #: 5    Attendees: Client attended alone     Treatment Plan Last Reviewed: 2/5/2019  PHQ-9 / NICOLASA-7 :     DATA  Interactive Complexity: No  Crisis: No       Progress Since Last Session (Related to Symptoms / Goals / Homework):   Symptoms: Improving decrease in anxiety and depression. Feeling like she made a huge decision and is satisfied with her change    Homework: Achieved / completed to satisfaction      Episode of Care Goals: Satisfactory progress - ACTION (Actively working towards change); Intervened by reinforcing change plan / affirming steps taken     Current / Ongoing Stressors and Concerns:   Decided to leave the house and stay with family, starting a trial separation with .  wants to continue working on marriage, and she is unsure that is something she wants at this time.     Treatment Objective(s) Addressed in This Session:   use cognitive strategies identified in therapy to challenge anxious thoughts  practice setting boundaries 3 times in the next 1 weeks  Exploring new opportunities     Intervention:   Interpersonal Therapy: processed thoughts and feelings about leaving her , and how the change has sparked different reactions in people. She is pleasantly surprised in how people have been supporting her, or responding differently than she expected.  Motivational Interviewing    MI Intervention: Expressed Empathy/Understanding, Supported Autonomy, Collaboration, Evocation, Open-ended questions and Change talk (evoked)     Change Talk Expressed by the Patient: Committment to change Activation Taking steps    Provider Response to Change Talk: E - Evoked more info from patient about behavior change, A - Affirmed patient's  thoughts, decisions, or attempts at behavior change, R - Reflected patient's change talk and S - Summarized patient's change talk statements          ASSESSMENT: Current Emotional / Mental Status (status of significant symptoms):   Risk status (Self / Other harm or suicidal ideation)   Client denies current fears or concerns for personal safety.   Client denies current or recent suicidal ideation or behaviors.   Client denies current or recent homicidal ideation or behaviors.   Client denies current or recent self injurious behavior or ideation.   Client denies other safety concerns.   Client Client reports there has been no change in risk factors since their last session.     Client Client reports there has been no change in protective factors since their last session.     A safety and risk management plan has not been developed at this time, however client was given the after-hours number / 911 should there be a change in any of these risk factors.     Appearance:   Appropriate    Eye Contact:   Good    Psychomotor Behavior: Normal    Attitude:   Cooperative    Orientation:   All   Speech    Rate / Production: Normal     Volume:  Normal    Mood:    Normal   Affect:    Appropriate    Thought Content:  Clear    Thought Form:  Coherent  Goal Directed  Logical    Insight:    Good      Medication Review:   No changes to current psychiatric medication(s)     Medication Compliance:   Yes     Changes in Health Issues:   None reported     Chemical Use Review:   Substance Use: Chemical use reviewed, no active concerns identified      Tobacco Use: No current tobacco use.      Diagnosis:  1. Generalized anxiety disorder        Collateral Reports Completed:   Not Applicable    PLAN: (Client Tasks / Therapist Tasks / Other)  Continue with individual therapy. Continue exploring pros and cons of marriage and pros and cons of potential job opportunity        Natividad Zarate, [facility]                                                       ________________________________________________________________________    Treatment Plan    Client's Name: Charis Kruger  YOB: 1990    Date: 2/5/2019    DSM-V Diagnoses: 300.02 (F41.1) Generalized Anxiety Disorder  Psychosocial / Contextual Factors: marital discord  WHODAS: 35    Referral / Collaboration:  Referral to another professional/service is not indicated at this time..    Anticipated number of session or this episode of care: 18-20      MeasurableTreatment Goal(s) related to diagnosis / functional impairment(s)  Goal 1: Client will evaluate pros and cons of staying in current marriage.    Objective #A (Client Action)    Client will assess gains and losses from leaving marriage.  Status: New - Date: 2/5/2019     Intervention(s)  Therapist will teach DBT skills pros and cons.    Objective #B  Client will assess gains and losses from staying in marriage.  Status: New - Date: 2/5/2019     Intervention(s)  Therapist will teach DBT pros and cons.    Goal 2: Client will explore anxiety symptoms related to change.    Objective #A (Client Action)    Status: New - Date: 2/5/2019     Client will use cognitive strategies identified in therapy to challenge anxious thoughts.    Intervention(s)  Therapist will teach thought stopping strategies.    Objective #B  Client will learn how thoughts, actions, and feelings are related to decision making.    Status: New - Date: 2/5/2019     Intervention(s)  Therapist will teach behavioral chaining.    Goal 3: Client will gain confidence in her decision making skills.    Objective #A (Client Action)    Status: New - Date: 2/5/2019     Client will identify what causes her lack of confidence in executing decisions.    Intervention(s)  Therapist will assign homework to improve assertiveness.    Objective #B  Client will learn & utilize at least 3 assertive communication skills weekly.    Status: New - Date: 2/5/2019     Intervention(s)  Therapist will role-play  effective communication skills.    Objective #C  Client will improve overall confidence and self-assurance.  Status: New - Date: 2/5/2019     Intervention(s)  Therapist will role-play assertiveness skills.      Client has not reviewed nor agreed to the above plan.      Natividad Zarate, [facility]  February 14, 2019

## 2019-02-22 ENCOUNTER — OFFICE VISIT (OUTPATIENT)
Dept: PSYCHOLOGY | Facility: CLINIC | Age: 29
End: 2019-02-22
Payer: COMMERCIAL

## 2019-02-22 DIAGNOSIS — F41.1 GENERALIZED ANXIETY DISORDER: Primary | ICD-10-CM

## 2019-02-22 PROCEDURE — 90834 PSYTX W PT 45 MINUTES: CPT | Performed by: COUNSELOR

## 2019-02-26 NOTE — PROGRESS NOTES
Progress Note    Client Name: Charis Kruger  Date: 2/22/2019         Service Type: Individual  Video Visit: No     Session Start Time: 9:00am  Session End Time: 9:50am     Session Length: 50 minutes    Session #: 6    Attendees: Client attended alone     Treatment Plan Last Reviewed: 2/5/2019  PHQ-9 / NICOLASA-7 :     DATA  Interactive Complexity: No  Crisis: No       Progress Since Last Session (Related to Symptoms / Goals / Homework):   Symptoms: Improving decrease in anxiety and depression. Feeling like she made a huge decision and is satisfied with her change    Homework: Achieved / completed to satisfaction      Episode of Care Goals: Satisfactory progress - ACTION (Actively working towards change); Intervened by reinforcing change plan / affirming steps taken     Current / Ongoing Stressors and Concerns:   Decided to leave the house and stay with family, starting a trial separation with .  wants to continue working on marriage, and she is unsure that is something she wants at this time. Has job interview that would result in her moving out of state this summer. Does not want  to move with her if she gets the job.      Treatment Objective(s) Addressed in This Session:   use cognitive strategies identified in therapy to challenge anxious thoughts  practice setting boundaries 3 times in the next 1 weeks  Exploring new opportunities     Intervention:   Interpersonal Therapy: Processed interaction with  on valentines's day that created anxiety symptoms for her. Felt that it would be easy to fall back into old patterns with him, but does not want to. Explored frustrations that her  is not respecting her boundaries and she has repeatedly told him that she needs space, but he continues to reach out to her directly and indirectly in order to resolve the relationship dynamics.  Motivational Interviewing    MI Intervention: Expressed  Empathy/Understanding, Supported Autonomy, Collaboration, Evocation, Open-ended questions and Change talk (evoked)     Change Talk Expressed by the Patient: Committment to change Activation Taking steps    Provider Response to Change Talk: E - Evoked more info from patient about behavior change, A - Affirmed patient's thoughts, decisions, or attempts at behavior change, R - Reflected patient's change talk and S - Summarized patient's change talk statements          ASSESSMENT: Current Emotional / Mental Status (status of significant symptoms):   Risk status (Self / Other harm or suicidal ideation)   Client denies current fears or concerns for personal safety.   Client denies current or recent suicidal ideation or behaviors.   Client denies current or recent homicidal ideation or behaviors.   Client denies current or recent self injurious behavior or ideation.   Client denies other safety concerns.   Client Client reports there has been no change in risk factors since their last session.     Client Client reports there has been no change in protective factors since their last session.     A safety and risk management plan has not been developed at this time, however client was given the after-hours number / 911 should there be a change in any of these risk factors.     Appearance:   Appropriate    Eye Contact:   Good    Psychomotor Behavior: Normal    Attitude:   Cooperative    Orientation:   All   Speech    Rate / Production: Normal     Volume:  Normal    Mood:    Normal   Affect:    Appropriate    Thought Content:  Clear    Thought Form:  Coherent  Goal Directed  Logical    Insight:    Good      Medication Review:   No changes to current psychiatric medication(s)     Medication Compliance:   Yes     Changes in Health Issues:   None reported     Chemical Use Review:   Substance Use: Chemical use reviewed, no active concerns identified      Tobacco Use: No current tobacco use.      Diagnosis:  No diagnosis  found.    Collateral Reports Completed:   Not Applicable    PLAN: (Client Tasks / Therapist Tasks / Other)  Continue with individual therapy. Continue exploring pros and cons of marriage and pros and cons of potential job opportunity        Natividad BERGERPapa Tessa, [facility]                                                      ________________________________________________________________________    Treatment Plan    Client's Name: Charis Kruger  YOB: 1990    Date: 2/5/2019    DSM-V Diagnoses: 300.02 (F41.1) Generalized Anxiety Disorder  Psychosocial / Contextual Factors: marital discord  WHODAS: 35    Referral / Collaboration:  Referral to another professional/service is not indicated at this time..    Anticipated number of session or this episode of care: 18-20      MeasurableTreatment Goal(s) related to diagnosis / functional impairment(s)  Goal 1: Client will evaluate pros and cons of staying in current marriage.    Objective #A (Client Action)    Client will assess gains and losses from leaving marriage.  Status: New - Date: 2/5/2019     Intervention(s)  Therapist will teach DBT skills pros and cons.    Objective #B  Client will assess gains and losses from staying in marriage.  Status: New - Date: 2/5/2019     Intervention(s)  Therapist will teach DBT pros and cons.    Goal 2: Client will explore anxiety symptoms related to change.    Objective #A (Client Action)    Status: New - Date: 2/5/2019     Client will use cognitive strategies identified in therapy to challenge anxious thoughts.    Intervention(s)  Therapist will teach thought stopping strategies.    Objective #B  Client will learn how thoughts, actions, and feelings are related to decision making.    Status: New - Date: 2/5/2019     Intervention(s)  Therapist will teach behavioral chaining.    Goal 3: Client will gain confidence in her decision making skills.    Objective #A (Client Action)    Status: New - Date: 2/5/2019     Client will  identify what causes her lack of confidence in executing decisions.    Intervention(s)  Therapist will assign homework to improve assertiveness.    Objective #B  Client will learn & utilize at least 3 assertive communication skills weekly.    Status: New - Date: 2/5/2019     Intervention(s)  Therapist will role-play effective communication skills.    Objective #C  Client will improve overall confidence and self-assurance.  Status: New - Date: 2/5/2019     Intervention(s)  Therapist will role-play assertiveness skills.      Client has not reviewed nor agreed to the above plan.      Natividad Zarate, LPC  February 22, 2019

## 2019-03-06 ENCOUNTER — OFFICE VISIT (OUTPATIENT)
Dept: PSYCHOLOGY | Facility: CLINIC | Age: 29
End: 2019-03-06
Payer: COMMERCIAL

## 2019-03-06 DIAGNOSIS — F41.1 GENERALIZED ANXIETY DISORDER: Primary | ICD-10-CM

## 2019-03-06 PROCEDURE — 90834 PSYTX W PT 45 MINUTES: CPT | Performed by: COUNSELOR

## 2019-03-07 NOTE — PROGRESS NOTES
Progress Note    Client Name: Charis Kruger  Date: 3/6/2019         Service Type: Individual  Video Visit: No     Session Start Time: 10:00am  Session End Time: 10:50am     Session Length: 50 minutes    Session #: 7    Attendees: Client attended alone     Treatment Plan Last Reviewed: 2/5/2019  PHQ-9 / NICOLASA-7 :     DATA  Interactive Complexity: No  Crisis: No       Progress Since Last Session (Related to Symptoms / Goals / Homework):   Symptoms: Improving decrease in anxiety and depression. Feeling like she made a huge decision and is satisfied with her change    Homework: Achieved / completed to satisfaction      Episode of Care Goals: Satisfactory progress - ACTION (Actively working towards change); Intervened by reinforcing change plan / affirming steps taken     Current / Ongoing Stressors and Concerns:   Has continued to set boundaries with , and continues to reside with her parents. Has had a few more interactions and conversations with her . Also had job interview that would move her to [place] if she gets the job.      Treatment Objective(s) Addressed in This Session:   use cognitive strategies identified in therapy to challenge anxious thoughts  practice setting boundaries 3 times in the next 1 weeks  Exploring new opportunities     Intervention:   Interpersonal Therapy: Processed interactions with  since last week. Explored the boundaries she has set with him and how she has been reinforcing her boundaries. She reported that he has been better at following them. Also explored another romantic/friend relationship. Thinking about potential romantic involvement after she has done more work on herself and been single for a while.  Motivational Interviewing    MI Intervention: Expressed Empathy/Understanding, Supported Autonomy, Collaboration, Evocation, Open-ended questions and Change talk (evoked)     Change Talk Expressed by the  Patient: Committment to change Activation Taking steps    Provider Response to Change Talk: E - Evoked more info from patient about behavior change, A - Affirmed patient's thoughts, decisions, or attempts at behavior change, R - Reflected patient's change talk and S - Summarized patient's change talk statements          ASSESSMENT: Current Emotional / Mental Status (status of significant symptoms):   Risk status (Self / Other harm or suicidal ideation)   Client denies current fears or concerns for personal safety.   Client denies current or recent suicidal ideation or behaviors.   Client denies current or recent homicidal ideation or behaviors.   Client denies current or recent self injurious behavior or ideation.   Client denies other safety concerns.   Client Client reports there has been no change in risk factors since their last session.     Client Client reports there has been no change in protective factors since their last session.     A safety and risk management plan has not been developed at this time, however client was given the after-hours number / 911 should there be a change in any of these risk factors.     Appearance:   Appropriate    Eye Contact:   Good    Psychomotor Behavior: Normal    Attitude:   Cooperative    Orientation:   All   Speech    Rate / Production: Normal     Volume:  Normal    Mood:    Normal   Affect:    Appropriate    Thought Content:  Clear    Thought Form:  Coherent  Goal Directed  Logical    Insight:    Good      Medication Review:   No changes to current psychiatric medication(s)     Medication Compliance:   Yes     Changes in Health Issues:   None reported     Chemical Use Review:   Substance Use: Chemical use reviewed, no active concerns identified      Tobacco Use: No current tobacco use.      Diagnosis:  1. Generalized anxiety disorder        Collateral Reports Completed:   Not Applicable    PLAN: (Client Tasks / Therapist Tasks / Other)  Continue with individual therapy.  Continue setting limits/boundaries with .       Natividad Zarate, LPC                                                      ________________________________________________________________________    Treatment Plan    Client's Name: Charis Kruger  YOB: 1990    Date: 2/5/2019    DSM-V Diagnoses: 300.02 (F41.1) Generalized Anxiety Disorder  Psychosocial / Contextual Factors: marital discord  WHODAS: 35    Referral / Collaboration:  Referral to another professional/service is not indicated at this time..    Anticipated number of session or this episode of care: 18-20      MeasurableTreatment Goal(s) related to diagnosis / functional impairment(s)  Goal 1: Client will evaluate pros and cons of staying in current marriage.    Objective #A (Client Action)    Client will assess gains and losses from leaving marriage.  Status: New - Date: 2/5/2019     Intervention(s)  Therapist will teach DBT skills pros and cons.    Objective #B  Client will assess gains and losses from staying in marriage.  Status: New - Date: 2/5/2019     Intervention(s)  Therapist will teach DBT pros and cons.    Goal 2: Client will explore anxiety symptoms related to change.    Objective #A (Client Action)    Status: New - Date: 2/5/2019     Client will use cognitive strategies identified in therapy to challenge anxious thoughts.    Intervention(s)  Therapist will teach thought stopping strategies.    Objective #B  Client will learn how thoughts, actions, and feelings are related to decision making.    Status: New - Date: 2/5/2019     Intervention(s)  Therapist will teach behavioral chaining.    Goal 3: Client will gain confidence in her decision making skills.    Objective #A (Client Action)    Status: New - Date: 2/5/2019     Client will identify what causes her lack of confidence in executing decisions.    Intervention(s)  Therapist will assign homework to improve assertiveness.    Objective #B  Client will learn &  utilize at least 3 assertive communication skills weekly.    Status: New - Date: 2/5/2019     Intervention(s)  Therapist will role-play effective communication skills.    Objective #C  Client will improve overall confidence and self-assurance.  Status: New - Date: 2/5/2019     Intervention(s)  Therapist will role-play assertiveness skills.      Client has not reviewed nor agreed to the above plan.      Natividad Zarate, [place]  March 7, 2019

## 2019-03-14 ENCOUNTER — OFFICE VISIT (OUTPATIENT)
Dept: PSYCHOLOGY | Facility: CLINIC | Age: 29
End: 2019-03-14
Payer: COMMERCIAL

## 2019-03-14 DIAGNOSIS — F41.1 GENERALIZED ANXIETY DISORDER: Primary | ICD-10-CM

## 2019-03-14 PROCEDURE — 90834 PSYTX W PT 45 MINUTES: CPT | Performed by: COUNSELOR

## 2019-03-18 ENCOUNTER — TELEPHONE (OUTPATIENT)
Dept: FAMILY MEDICINE | Facility: CLINIC | Age: 29
End: 2019-03-18

## 2019-03-18 DIAGNOSIS — F41.9 ANXIETY: ICD-10-CM

## 2019-03-18 DIAGNOSIS — F39 MOOD DISORDER (H): Primary | ICD-10-CM

## 2019-03-18 NOTE — LETTER
March 22, 2019    Charis Kruger  [address]  [facility] [address]    Dear Charis,       We recently received a refill request for clonazePAM (KLONOPIN) 0.5 MG tablet.  We were unable to refil this because you are due for a:    Anxiety office visit      Please call at your earliest convenience so that there will not be a delay with your future refills.          Thank you,   Your [facility] Team/  [phone]

## 2019-03-18 NOTE — TELEPHONE ENCOUNTER
Controlled Substance Refill Request for Clonazepam  Problem List Complete:  No     PROVIDER TO CONSIDER COMPLETION OF PROBLEM LIST AND OVERVIEW/CONTROLLED SUBSTANCE AGREEMENT    Last Written Prescription Date:  1/28/19  Last Fill Quantity: 20,   # refills: 1      Last Office Visit with [facility] primary care provider: 1/28/19 - telephone visit    Future Office visit:   Next 5 appointments (look out 90 days)    Mar 29, 2019 10:00 AM CDT  Return Visit with Natividad Zarate LPC  [facility] ([facility]) [address] [facility] [address] [phone]          Controlled substance agreement:   Encounter-Level CSA:    There are no encounter-level csa.     Patient-Level CSA:    There are no patient-level csa.         Last Urine Drug Screen: No results found for: CDAUT, No results found for: COMDAT, No results found for: THC13, PCP13, COC13, MAMP13, OPI13, AMP13, BZO13, TCA13, MTD13, BAR13, OXY13, PPX13, BUP13       [email or web address]       checked in past 3 months?  Yes 3/18/19, last fille 2/21/19     Kasandra BECERRAN, RN, CPN

## 2019-03-18 NOTE — TELEPHONE ENCOUNTER
Pharmacy is calling to request refill RX: clonazePAM (KLONOPIN) 0.5 MG tablet, FLUoxetine (PROZAC) 20 MG capsule. Please call to advise. Caller informed that calls received after 3pm may not be returned same day. Thank  You.

## 2019-03-19 NOTE — TELEPHONE ENCOUNTER
Copied from my last visit with patient:    (F43.23) Adjustment disorder with mixed anxiety and depressed mood  Comment:   Plan: clonazePAM (KLONOPIN) 0.5 MG tablet          Recheck in person for more clonopin  Otherwise recheck in 6 months ok to refill prozac until then, she does not need more right now       I'll send over more prozac. Pharmacy wasn't pended so I will pick the most recent one, Brenda Gutierrez

## 2019-03-19 NOTE — TELEPHONE ENCOUNTER
Patient needs appointment for Clonazepam refill     - please call patient   Kasandra BECERRAN, RN, CPN

## 2019-03-20 NOTE — PROGRESS NOTES
Progress Note    Client Name: Charis Kruger  Date: 3/14/2019         Service Type: Individual  Video Visit: No     Session Start Time: 10:30am  Session End Time: 11:20am     Session Length: 50 minutes    Session #: 8    Attendees: Client attended alone     Treatment Plan Last Reviewed: 2/5/2019  PHQ-9 / NICOLASA-7 :     DATA  Interactive Complexity: No  Crisis: No       Progress Since Last Session (Related to Symptoms / Goals / Homework):   Symptoms: Improving decrease in anxiety and depression. Feeling like she made a huge decision and is satisfied with her change    Homework: Achieved / completed to satisfaction      Episode of Care Goals: Satisfactory progress - ACTION (Actively working towards change); Intervened by reinforcing change plan / affirming steps taken     Current / Ongoing Stressors and Concerns:   Has continued to set boundaries with , and continues to reside with her parents. Has had a few more interactions and conversations with her . Also had job interview that would move her to [place] if she gets the job.      Treatment Objective(s) Addressed in This Session:   identify 3 stressors which contribute to feelings of anxiety  practice setting boundaries 3 times in the next 1 weeks     Intervention:   Interpersonal Therapy: Has been having experiences with friends who are now starting to challenge her decision to leave  and separate at this time, as well as challenge her decision to apply for a job out of state. Started second guessing herself and her decision making. Explored how her decisions make her feel, and whether she believes that she is making the choices right for her, versus making choices based on what other people want for her.     Motivational Interviewing    MI Intervention: Expressed Empathy/Understanding, Supported Autonomy, Collaboration, Evocation, Open-ended questions and Change talk (evoked)     Change Talk  Expressed by the Patient: Committment to change Activation Taking steps    Provider Response to Change Talk: E - Evoked more info from patient about behavior change, A - Affirmed patient's thoughts, decisions, or attempts at behavior change, R - Reflected patient's change talk and S - Summarized patient's change talk statements          ASSESSMENT: Current Emotional / Mental Status (status of significant symptoms):   Risk status (Self / Other harm or suicidal ideation)   Client denies current fears or concerns for personal safety.   Client denies current or recent suicidal ideation or behaviors.   Client denies current or recent homicidal ideation or behaviors.   Client denies current or recent self injurious behavior or ideation.   Client denies other safety concerns.   Client Client reports there has been no change in risk factors since their last session.     Client Client reports there has been no change in protective factors since their last session.     A safety and risk management plan has not been developed at this time, however client was given the after-hours number / 911 should there be a change in any of these risk factors.     Appearance:   Appropriate    Eye Contact:   Good    Psychomotor Behavior: Normal    Attitude:   Cooperative    Orientation:   All   Speech    Rate / Production: Normal     Volume:  Normal    Mood:    Normal   Affect:    Appropriate    Thought Content:  Clear    Thought Form:  Coherent  Goal Directed  Logical    Insight:    Good      Medication Review:   No changes to current psychiatric medication(s)     Medication Compliance:   Yes     Changes in Health Issues:   None reported     Chemical Use Review:   Substance Use: Chemical use reviewed, no active concerns identified      Tobacco Use: No current tobacco use.      Diagnosis:  1. Generalized anxiety disorder        Collateral Reports Completed:   Not Applicable    PLAN: (Client Tasks / Therapist Tasks / Other)  Continue with  individual therapy. Continue setting limits/boundaries with .       Natividad Zarate, LPC                                                      ________________________________________________________________________    Treatment Plan    Client's Name: Charis Kruger  YOB: 1990    Date: 2/5/2019    DSM-V Diagnoses: 300.02 (F41.1) Generalized Anxiety Disorder  Psychosocial / Contextual Factors: marital discord  WHODAS: 35    Referral / Collaboration:  Referral to another professional/service is not indicated at this time..    Anticipated number of session or this episode of care: 18-20      MeasurableTreatment Goal(s) related to diagnosis / functional impairment(s)  Goal 1: Client will evaluate pros and cons of staying in current marriage.    Objective #A (Client Action)    Client will assess gains and losses from leaving marriage.  Status: New - Date: 2/5/2019     Intervention(s)  Therapist will teach DBT skills pros and cons.    Objective #B  Client will assess gains and losses from staying in marriage.  Status: New - Date: 2/5/2019     Intervention(s)  Therapist will teach DBT pros and cons.    Goal 2: Client will explore anxiety symptoms related to change.    Objective #A (Client Action)    Status: New - Date: 2/5/2019     Client will use cognitive strategies identified in therapy to challenge anxious thoughts.    Intervention(s)  Therapist will teach thought stopping strategies.    Objective #B  Client will learn how thoughts, actions, and feelings are related to decision making.    Status: New - Date: 2/5/2019     Intervention(s)  Therapist will teach behavioral chaining.    Goal 3: Client will gain confidence in her decision making skills.    Objective #A (Client Action)    Status: New - Date: 2/5/2019     Client will identify what causes her lack of confidence in executing decisions.    Intervention(s)  Therapist will assign homework to improve assertiveness.    Objective #B  Client  will learn & utilize at least 3 assertive communication skills weekly.    Status: New - Date: 2/5/2019     Intervention(s)  Therapist will role-play effective communication skills.    Objective #C  Client will improve overall confidence and self-assurance.  Status: New - Date: 2/5/2019     Intervention(s)  Therapist will role-play assertiveness skills.      Client has not reviewed nor agreed to the above plan.      Natividad Zarate, [place]  March 18, 2019

## 2019-03-26 DIAGNOSIS — F41.9 ANXIETY: ICD-10-CM

## 2019-03-26 DIAGNOSIS — F43.23 ADJUSTMENT DISORDER WITH MIXED ANXIETY AND DEPRESSED MOOD: ICD-10-CM

## 2019-03-26 RX ORDER — CLONAZEPAM 0.5 MG/1
0.25-0.5 TABLET ORAL 2 TIMES DAILY PRN
Qty: 20 TABLET | Refills: 1 | OUTPATIENT
Start: 2019-03-26

## 2019-03-26 NOTE — TELEPHONE ENCOUNTER
Controlled Substance Refill Request for klonopin  Problem List Complete:  No     PROVIDER TO CONSIDER COMPLETION OF PROBLEM LIST AND OVERVIEW/CONTROLLED SUBSTANCE AGREEMENT    Last Written Prescription Date:  1/28/19  Last Fill Quantity: 20,   # refills: 1    THE MOST RECENT OFFICE VISIT MUST BE WITHIN THE PAST 3 MONTHS. AT LEAST ONE FACE TO FACE VISIT MUST OCCUR EVERY 6 MONTHS. ADDITIONAL VISITS CAN BE VIRTUAL.  (THIS STATEMENT SHOULD BE DELETED.)    Last Office Visit with [facility] primary care provider: virtual visit with PCP 1/28/19    Future Office visit:   Next 5 appointments (look out 90 days)    Mar 29, 2019 10:00 AM CDT  Return Visit with Natividad Zarate LPC  [facility] ([facility]) [address] [facility] [address] [phone]          Controlled substance agreement:   Encounter-Level CSA:    There are no encounter-level csa.     Patient-Level CSA:    There are no patient-level csa.         Last Urine Drug Screen: No results found for: CDAUT, No results found for: COMDAT, No results found for: THC13, PCP13, COC13, MAMP13, OPI13, AMP13, BZO13, TCA13, MTD13, BAR13, OXY13, PPX13, BUP13     [email or web address]     checked in past 3 months?  Yes 3/26/19 no concerns     Debbie Ferreira RN, BSN

## 2019-03-26 NOTE — TELEPHONE ENCOUNTER
See my last note please which states in the plan she is due for OV for more clonopin.    Soto,  gurvinder

## 2019-03-29 ENCOUNTER — OFFICE VISIT (OUTPATIENT)
Dept: PSYCHOLOGY | Facility: CLINIC | Age: 29
End: 2019-03-29
Payer: COMMERCIAL

## 2019-03-29 DIAGNOSIS — F41.1 GENERALIZED ANXIETY DISORDER: Primary | ICD-10-CM

## 2019-03-29 PROCEDURE — 90834 PSYTX W PT 45 MINUTES: CPT | Performed by: COUNSELOR

## 2019-04-02 NOTE — PROGRESS NOTES
Progress Note    Client Name: Charis Kruger  Date: 3/29/2019         Service Type: Individual  Video Visit: No     Session Start Time: 10:15am  Session End Time: 11:00am     Session Length: 45 minutes    Session #: 9    Attendees: Client attended alone     Treatment Plan Last Reviewed: 2/5/2019  PHQ-9 / NICOLASA-7 :     DATA  Interactive Complexity: No  Crisis: No       Progress Since Last Session (Related to Symptoms / Goals / Homework):   Symptoms: Improving decrease in anxiety and depression. Feeling like she made a huge decision and is satisfied with her change. Went on vacation for a refresh, and had friend meet her on vacation. Ended up being an amazing trip and start of potential relationship    Homework: Achieved / completed to satisfaction      Episode of Care Goals: Satisfactory progress - ACTION (Actively working towards change); Intervened by reinforcing change plan / affirming steps taken     Current / Ongoing Stressors and Concerns:  Has continued to set boundaries with , and continues to reside with her parents. Has had a few more interactions and conversations with her . Recently learned that she did not get the job she applied for, but also connected with a friend that may become more of a romantic interest in the future.     Treatment Objective(s) Addressed in This Session:   identify 3 stressors which contribute to feelings of anxiety  practice setting boundaries 3 times in the next 1 weeks     Intervention:   Interpersonal Therapy: Went on vacation and best friend/potential romantic interest met her while on vacation. They grew closer. Explored appropriate boundaries with [place] and she is still working on resolving her dilemmas about her marriage. Explored what she wants out of the relationship and making sure that she is ready to move forward with relationship.    Motivational Interviewing    MI  Intervention: Expressed Empathy/Understanding, Supported Autonomy, Collaboration, Evocation, Open-ended questions and Change talk (evoked)     Change Talk Expressed by the Patient: Committment to change Activation Taking steps    Provider Response to Change Talk: E - Evoked more info from patient about behavior change, A - Affirmed patient's thoughts, decisions, or attempts at behavior change, R - Reflected patient's change talk and S - Summarized patient's change talk statements          ASSESSMENT: Current Emotional / Mental Status (status of significant symptoms):   Risk status (Self / Other harm or suicidal ideation)   Client denies current fears or concerns for personal safety.   Client denies current or recent suicidal ideation or behaviors.   Client denies current or recent homicidal ideation or behaviors.   Client denies current or recent self injurious behavior or ideation.   Client denies other safety concerns.   Client Client reports there has been no change in risk factors since their last session.     Client Client reports there has been no change in protective factors since their last session.     A safety and risk management plan has not been developed at this time, however client was given the after-hours number / 911 should there be a change in any of these risk factors.     Appearance:   Appropriate    Eye Contact:   Good    Psychomotor Behavior: Normal    Attitude:   Cooperative    Orientation:   All   Speech    Rate / Production: Normal     Volume:  Normal    Mood:    Normal   Affect:    Appropriate    Thought Content:  Clear    Thought Form:  Coherent  Goal Directed  Logical    Insight:    Good      Medication Review:   No changes to current psychiatric medication(s)     Medication Compliance:   Yes     Changes in Health Issues:   None reported     Chemical Use Review:   Substance Use: Chemical use reviewed, no active concerns identified      Tobacco Use: No current tobacco use.       Diagnosis:  1. Generalized anxiety disorder        Collateral Reports Completed:   Not Applicable    PLAN: (Client Tasks / Therapist Tasks / Other)  Continue with individual therapy. Continue setting limits/boundaries with  and best friend/romantic interest.       Natividadmichael Zarate, [facility]                                                      ________________________________________________________________________    Treatment Plan    Client's Name: Charis Kruger  YOB: 1990    Date: 2/5/2019    DSM-V Diagnoses: 300.02 (F41.1) Generalized Anxiety Disorder  Psychosocial / Contextual Factors: marital discord  WHODAS: 35    Referral / Collaboration:  Referral to another professional/service is not indicated at this time..    Anticipated number of session or this episode of care: 18-20      MeasurableTreatment Goal(s) related to diagnosis / functional impairment(s)  Goal 1: Client will evaluate pros and cons of staying in current marriage.    Objective #A (Client Action)    Client will assess gains and losses from leaving marriage.  Status: New - Date: 2/5/2019     Intervention(s)  Therapist will teach DBT skills pros and cons.    Objective #B  Client will assess gains and losses from staying in marriage.  Status: New - Date: 2/5/2019     Intervention(s)  Therapist will teach DBT pros and cons.    Goal 2: Client will explore anxiety symptoms related to change.    Objective #A (Client Action)    Status: New - Date: 2/5/2019     Client will use cognitive strategies identified in therapy to challenge anxious thoughts.    Intervention(s)  Therapist will teach thought stopping strategies.    Objective #B  Client will learn how thoughts, actions, and feelings are related to decision making.    Status: New - Date: 2/5/2019     Intervention(s)  Therapist will teach behavioral chaining.    Goal 3: Client will gain confidence in her decision making skills.    Objective #A (Client Action)    Status: New  - Date: 2/5/2019     Client will identify what causes her lack of confidence in executing decisions.    Intervention(s)  Therapist will assign homework to improve assertiveness.    Objective #B  Client will learn & utilize at least 3 assertive communication skills weekly.    Status: New - Date: 2/5/2019     Intervention(s)  Therapist will role-play effective communication skills.    Objective #C  Client will improve overall confidence and self-assurance.  Status: New - Date: 2/5/2019     Intervention(s)  Therapist will role-play assertiveness skills.      Client has not reviewed nor agreed to the above plan.      Natividad Zaraet, LPC  March 29, 2019

## 2019-04-17 ENCOUNTER — OFFICE VISIT (OUTPATIENT)
Dept: PSYCHOLOGY | Facility: CLINIC | Age: 29
End: 2019-04-17
Payer: COMMERCIAL

## 2019-04-17 DIAGNOSIS — F41.1 GENERALIZED ANXIETY DISORDER: Primary | ICD-10-CM

## 2019-04-17 PROCEDURE — 90834 PSYTX W PT 45 MINUTES: CPT | Performed by: COUNSELOR

## 2019-04-22 NOTE — PROGRESS NOTES
Progress Note    Client Name: Charis Kruger  Date: 4/17/2019         Service Type: Individual  Video Visit: No     Session Start Time: 1:00pm  Session End Time: 1:50pm     Session Length: 50 minutes    Session #: 10    Attendees: Client attended alone     Treatment Plan Last Reviewed: 2/5/2019  PHQ-9 / NICOLASA-7 :     DATA  Interactive Complexity: No  Crisis: No       Progress Since Last Session (Related to Symptoms / Goals / Homework):   Symptoms: No change Starting to come down from the excitement from her trip, and mood is beginning to stabilize more. Some extra crying and feelings of missing  which caught her by surprise    Homework: Achieved / completed to satisfaction      Episode of Care Goals: Satisfactory progress - ACTION (Actively working towards change); Intervened by reinforcing change plan / affirming steps taken     Current / Ongoing Stressors and Concerns:  Has continued to set boundaries with , and continues to reside with her parents. Has had a few more interactions and conversations with her . 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     Treatment Objective(s) Addressed in This Session:   talk to at least two others about losses and coping  practice setting boundaries 3 times in the next 2 weeks     Intervention:   CBT: Started having thoughts about missing  which were confusing to her. Explained to him that she is not looking at reconciling at this point, but is also nervous about filing for divorce. Explored her thoughts and schemas about divorce to see what might be holding her back. Also explored the idea of grief and loss, and missing  may be related more to missing companionship and support especially when she is lonely.    Motivational Interviewing    MI  Intervention: Expressed Empathy/Understanding, Supported Autonomy, Collaboration, Evocation, Open-ended questions and Change talk (evoked)     Change Talk Expressed by the Patient: Committment to change Activation Taking steps    Provider Response to Change Talk: E - Evoked more info from patient about behavior change, A - Affirmed patient's thoughts, decisions, or attempts at behavior change, R - Reflected patient's change talk and S - Summarized patient's change talk statements          ASSESSMENT: Current Emotional / Mental Status (status of significant symptoms):   Risk status (Self / Other harm or suicidal ideation)   Client denies current fears or concerns for personal safety.   Client denies current or recent suicidal ideation or behaviors.   Client denies current or recent homicidal ideation or behaviors.   Client denies current or recent self injurious behavior or ideation.   Client denies other safety concerns.   Client Client reports there has been no change in risk factors since their last session.     Client Client reports there has been no change in protective factors since their last session.     A safety and risk management plan has not been developed at this time, however client was given the after-hours number / 911 should there be a change in any of these risk factors.     Appearance:   Appropriate    Eye Contact:   Good    Psychomotor Behavior: Normal    Attitude:   Cooperative    Orientation:   All   Speech    Rate / Production: Normal     Volume:  Normal    Mood:    Normal   Affect:    Appropriate    Thought Content:  Clear    Thought Form:  Coherent  Goal Directed  Logical    Insight:    Good      Medication Review:   No changes to current psychiatric medication(s)     Medication Compliance:   Yes     Changes in Health Issues:   None reported     Chemical Use Review:   Substance Use: Chemical use reviewed, no active concerns identified      Tobacco Use: No current tobacco use.       Diagnosis:  1. Generalized anxiety disorder        Collateral Reports Completed:   Not Applicable    PLAN: (Client Tasks / Therapist Tasks / Other)  Continue with individual therapy. Continue setting limits/boundaries with  and best friend/romantic interest.       Natividadmichael Zarate, [facility]                                                      ________________________________________________________________________    Treatment Plan    Client's Name: Charis Kruger  YOB: 1990    Date: 2/5/2019    DSM-V Diagnoses: 300.02 (F41.1) Generalized Anxiety Disorder  Psychosocial / Contextual Factors: marital discord  WHODAS: 35    Referral / Collaboration:  Referral to another professional/service is not indicated at this time..    Anticipated number of session or this episode of care: 18-20      MeasurableTreatment Goal(s) related to diagnosis / functional impairment(s)  Goal 1: Client will evaluate pros and cons of staying in current marriage.    Objective #A (Client Action)    Client will assess gains and losses from leaving marriage.  Status: New - Date: 2/5/2019     Intervention(s)  Therapist will teach DBT skills pros and cons.    Objective #B  Client will assess gains and losses from staying in marriage.  Status: New - Date: 2/5/2019     Intervention(s)  Therapist will teach DBT pros and cons.    Goal 2: Client will explore anxiety symptoms related to change.    Objective #A (Client Action)    Status: New - Date: 2/5/2019     Client will use cognitive strategies identified in therapy to challenge anxious thoughts.    Intervention(s)  Therapist will teach thought stopping strategies.    Objective #B  Client will learn how thoughts, actions, and feelings are related to decision making.    Status: New - Date: 2/5/2019     Intervention(s)  Therapist will teach behavioral chaining.    Goal 3: Client will gain confidence in her decision making skills.    Objective #A (Client Action)    Status: New  - Date: 2/5/2019     Client will identify what causes her lack of confidence in executing decisions.    Intervention(s)  Therapist will assign homework to improve assertiveness.    Objective #B  Client will learn & utilize at least 3 assertive communication skills weekly.    Status: New - Date: 2/5/2019     Intervention(s)  Therapist will role-play effective communication skills.    Objective #C  Client will improve overall confidence and self-assurance.  Status: New - Date: 2/5/2019     Intervention(s)  Therapist will role-play assertiveness skills.      Client has not reviewed nor agreed to the above plan.      Natividad Zarate, LPC  April 22, 2019

## 2019-06-11 ENCOUNTER — PSYCHE (OUTPATIENT)
Dept: PSYCHOLOGY | Facility: CLINIC | Age: 29
End: 2019-06-11
Payer: COMMERCIAL

## 2019-06-11 DIAGNOSIS — F41.1 GENERALIZED ANXIETY DISORDER: Primary | ICD-10-CM

## 2019-06-11 PROCEDURE — 90834 PSYTX W PT 45 MINUTES: CPT | Performed by: COUNSELOR

## 2019-06-12 DIAGNOSIS — F41.9 ANXIETY: ICD-10-CM

## 2019-06-12 DIAGNOSIS — F43.23 ADJUSTMENT DISORDER WITH MIXED ANXIETY AND DEPRESSED MOOD: ICD-10-CM

## 2019-06-12 DIAGNOSIS — F39 MOOD DISORDER (H): ICD-10-CM

## 2019-06-13 NOTE — TELEPHONE ENCOUNTER
Controlled Substance Refill Request for klonopin  Problem List Complete:  No     PROVIDER TO CONSIDER COMPLETION OF PROBLEM LIST AND OVERVIEW/CONTROLLED SUBSTANCE AGREEMENT    Last Written Prescription Date:  1/28/19  Last Fill Quantity: 20,   # refills: 1    THE MOST RECENT OFFICE VISIT MUST BE WITHIN THE PAST 3 MONTHS. AT LEAST ONE FACE TO FACE VISIT MUST OCCUR EVERY 6 MONTHS. ADDITIONAL VISITS CAN BE VIRTUAL.  (THIS STATEMENT SHOULD BE DELETED.)    [place] Office Visit [place] [facility] [place]: 1/28/19    Future Office visit: none    Controlled substance agreement:   Encounter-Level CSA:    There are no encounter-level csa.     Patient-Level CSA:    There are no patient-level csa.         Last Urine Drug Screen: No results found for: CDAUT, No results found for: COMDAT, No results found for: THC13, PCP13, COC13, MAMP13, OPI13, AMP13, BZO13, TCA13, MTD13, BAR13, OXY13, PPX13, BUP13     [email or web address]       checked in past 3 months?  Yes 6/13/19 no concerns     Debbie BECERRAN, RN

## 2019-06-14 ENCOUNTER — MYC REFILL (OUTPATIENT)
Dept: FAMILY MEDICINE | Facility: CLINIC | Age: 29
End: 2019-06-14

## 2019-06-14 DIAGNOSIS — F39 MOOD DISORDER (H): ICD-10-CM

## 2019-06-17 ENCOUNTER — E-VISIT (OUTPATIENT)
Dept: FAMILY MEDICINE | Facility: CLINIC | Age: 29
End: 2019-06-17
Payer: COMMERCIAL

## 2019-06-17 DIAGNOSIS — Z53.9 ERRONEOUS ENCOUNTER--DISREGARD: Primary | ICD-10-CM

## 2019-06-17 RX ORDER — CLONAZEPAM 0.5 MG/1
0.25-0.5 TABLET ORAL 2 TIMES DAILY PRN
Qty: 20 TABLET | Refills: 1 | OUTPATIENT
Start: 2019-06-17

## 2019-06-17 ASSESSMENT — ANXIETY QUESTIONNAIRES
GAD7 TOTAL SCORE: 13
7. FEELING AFRAID AS IF SOMETHING AWFUL MIGHT HAPPEN: MORE THAN HALF THE DAYS
5. BEING SO RESTLESS THAT IT IS HARD TO SIT STILL: SEVERAL DAYS
7. FEELING AFRAID AS IF SOMETHING AWFUL MIGHT HAPPEN: MORE THAN HALF THE DAYS
6. BECOMING EASILY ANNOYED OR IRRITABLE: MORE THAN HALF THE DAYS
2. NOT BEING ABLE TO STOP OR CONTROL WORRYING: MORE THAN HALF THE DAYS
GAD7 TOTAL SCORE: 13
3. WORRYING TOO MUCH ABOUT DIFFERENT THINGS: MORE THAN HALF THE DAYS
4. TROUBLE RELAXING: MORE THAN HALF THE DAYS
1. FEELING NERVOUS, ANXIOUS, OR ON EDGE: MORE THAN HALF THE DAYS
GAD7 TOTAL SCORE: 13

## 2019-06-17 ASSESSMENT — PATIENT HEALTH QUESTIONNAIRE - PHQ9
10. IF YOU CHECKED OFF ANY PROBLEMS, HOW DIFFICULT HAVE THESE PROBLEMS MADE IT FOR YOU TO DO YOUR WORK, TAKE CARE OF THINGS AT HOME, OR GET ALONG WITH OTHER PEOPLE: SOMEWHAT DIFFICULT
SUM OF ALL RESPONSES TO PHQ QUESTIONS 1-9: 13
SUM OF ALL RESPONSES TO PHQ QUESTIONS 1-9: 13

## 2019-06-17 NOTE — TELEPHONE ENCOUNTER
What is this patient asking for?  She is due for visit. Looks like her prozac was already refilled.       gurvinder

## 2019-06-18 ASSESSMENT — PATIENT HEALTH QUESTIONNAIRE - PHQ9: SUM OF ALL RESPONSES TO PHQ QUESTIONS 1-9: 13

## 2019-06-18 ASSESSMENT — ANXIETY QUESTIONNAIRES: GAD7 TOTAL SCORE: 13

## 2019-06-21 NOTE — PROGRESS NOTES
Progress Note    Client Name: Charis Kruger  Date: 6/11/2019         Service Type: Individual  Video Visit: No     Session Start Time: 5:00pm  Session End Time: 5:50pm     Session Length: 50 minutes    Session #: 11    Attendees: Client attended alone     Treatment Plan Last Reviewed: 2/5/2019  PHQ-9 / NICOLASA-7 :     DATA  Interactive Complexity: No  Crisis: No       Progress Since Last Session (Related to Symptoms / Goals / Homework):   Symptoms: Improving  is starting to accept that she is leaving and they are . Feeling more refreshed and renewed, and confirming of her decision.    Homework: Achieved / completed to satisfaction      Episode of Care Goals: Satisfactory progress - ACTION (Actively working towards change); Intervened by reinforcing change plan / affirming steps taken     Current / Ongoing Stressors and Concerns:  Planning on moving to [place] in July. She and  are , which is causing drama amongst peer group..     Treatment Objective(s) Addressed in This Session:   talk to at least two others about losses and coping  practice setting boundaries 3 times in the next 2 weeks     Intervention:   Interpersonal Therapy: Exploring relationship dynamics in her group of friends, and how she has been feeling as though she is getting pushed out of the group, there is increased drama about her, and she wants to confront some of the peers causing the stressors. Explored ways to best address the situation for her.    Motivational Interviewing    MI Intervention: Expressed Empathy/Understanding, Supported Autonomy, Collaboration, Evocation, Open-ended questions and Change talk (evoked)     Change Talk Expressed by the Patient: Committment to change Activation Taking steps    Provider Response to Change Talk: E - Evoked more info from patient about behavior change, A - Affirmed patient's thoughts, decisions, or attempts at behavior  change, R - Reflected patient's change talk and S - Summarized patient's change talk statements          ASSESSMENT: Current Emotional / Mental Status (status of significant symptoms):   Risk status (Self / Other harm or suicidal ideation)   Client denies current fears or concerns for personal safety.   Client denies current or recent suicidal ideation or behaviors.   Client denies current or recent homicidal ideation or behaviors.   Client denies current or recent self injurious behavior or ideation.   Client denies other safety concerns.   Client Client reports there has been no change in risk factors since their last session.     Client Client reports there has been no change in protective factors since their last session.     A safety and risk management plan has not been developed at this time, however client was given the after-hours number / 911 should there be a change in any of these risk factors.     Appearance:   Appropriate    Eye Contact:   Good    Psychomotor Behavior: Normal    Attitude:   Cooperative    Orientation:   All   Speech    Rate / Production: Normal     Volume:  Normal    Mood:    Normal   Affect:    Appropriate    Thought Content:  Clear    Thought Form:  Coherent  Goal Directed  Logical    Insight:    Good      Medication Review:   No changes to current psychiatric medication(s)     Medication Compliance:   Yes     Changes in Health Issues:   None reported     Chemical Use Review:   Substance Use: Chemical use reviewed, no active concerns identified      Tobacco Use: No current tobacco use.      Diagnosis:  1. Generalized anxiety disorder        Collateral Reports Completed:   Not Applicable    PLAN: (Client Tasks / Therapist Tasks / Other)  Continue with individual therapy. Prepare for transition and move to [place]. Looking for referrals to therapists in [place].      Natividad Zarate, [facility]                                                       ________________________________________________________________________    Treatment Plan    Client's Name: Charis Kruger  YOB: 1990    Date: 2/5/2019    DSM-V Diagnoses: 300.02 (F41.1) Generalized Anxiety Disorder  Psychosocial / Contextual Factors: marital discord  WHODAS: 35    Referral / Collaboration:  Referral to another professional/service is not indicated at this time..    Anticipated number of session or this episode of care: 18-20      MeasurableTreatment Goal(s) related to diagnosis / functional impairment(s)  Goal 1: Client will evaluate pros and cons of staying in current marriage.    Objective #A (Client Action)    Client will assess gains and losses from leaving marriage.  Status: New - Date: 2/5/2019     Intervention(s)  Therapist will teach DBT skills pros and cons.    Objective #B  Client will assess gains and losses from staying in marriage.  Status: New - Date: 2/5/2019     Intervention(s)  Therapist will teach DBT pros and cons.    Goal 2: Client will explore anxiety symptoms related to change.    Objective #A (Client Action)    Status: New - Date: 2/5/2019     Client will use cognitive strategies identified in therapy to challenge anxious thoughts.    Intervention(s)  Therapist will teach thought stopping strategies.    Objective #B  Client will learn how thoughts, actions, and feelings are related to decision making.    Status: New - Date: 2/5/2019     Intervention(s)  Therapist will teach behavioral chaining.    Goal 3: Client will gain confidence in her decision making skills.    Objective #A (Client Action)    Status: New - Date: 2/5/2019     Client will identify what causes her lack of confidence in executing decisions.    Intervention(s)  Therapist will assign homework to improve assertiveness.    Objective #B  Client will learn & utilize at least 3 assertive communication skills weekly.    Status: New - Date: 2/5/2019     Intervention(s)  Therapist will role-play  effective communication skills.    Objective #C  Client will improve overall confidence and self-assurance.  Status: New - Date: 2/5/2019     Intervention(s)  Therapist will role-play assertiveness skills.      Client has not reviewed nor agreed to the above plan.      Natividad Zarate, [facility]  June 21, 2019

## 2019-06-24 NOTE — PROGRESS NOTES
Subjective     Charis Kruger is a 29 year old female who presents to clinic today for the following health issues:    HPI     Depression and Anxiety Follow-Up    How are you doing with your depression since your last visit? Stable    How are you doing with your anxiety since your last visit?  stable    Are you having other symptoms that might be associated with depression or anxiety? No    Have you had a significant life event? No     Do you have any concerns with your use of alcohol or other drugs? No    Social History     Tobacco Use     Smoking status: Never Smoker     Smokeless tobacco: Never Used   Substance Use Topics     Alcohol use: Yes     Comment: socially     Drug use: No     PHQ 7/13/2018 1/28/2019 6/17/2019   PHQ-9 Total Score 11 2 13   Q9: Thoughts of better off dead/self-harm past 2 weeks Several days Not at all Not at all   F/U: Thoughts of suicide or self-harm No - -   F/U: Safety concerns No - -     NICOLASA-7 SCORE 7/13/2018 1/28/2019 6/17/2019   Total Score - - 13 (moderate anxiety)   Total Score 12 3 13     \    Suicide Assessment Five-step Evaluation and Treatment (SAFE-T)    Amount of exercise or physical activity: None    Problems taking medications regularly: No    Medication side effects: none    Diet: regular (no restrictions)    Is on prozac 40 mg, 60 made her feel tired and like a zombie. Changed about a week ago and is feeling better.   Is seeing our therapist at [facility].   Denies suicidal or homicidal thoughts.  Patient instructed to go to the emergency room or call 911 if these occur.    [place] registry-no concerns last [place] clonopin was in February.  Takes rarely now per patient.     Moving to [place] in 2 weeks.        Nonsmoker.         Patient Active Problem List   Diagnosis     Anxiety     Adjustment disorder with mixed anxiety and depressed mood     ASCUS of cervix with negative high risk HPV     Past Surgical History:   Procedure Laterality Date     BACK SURGERY  2008     "tailbone surgery     NO HISTORY OF SURGERY         Social History     Tobacco Use     Smoking status: Never Smoker     Smokeless tobacco: Never Used   Substance Use Topics     Alcohol use: Yes     Comment: socially     Family History   Problem Relation Age of Onset     Anxiety Disorder Mother      Anxiety Disorder Father      Anxiety Disorder Maternal Grandmother      Macular Degeneration Paternal Grandfather      Anxiety Disorder Sister      Breast Cancer No family hx of         mother grandmother     Glaucoma No family hx of          Current Outpatient Medications   Medication Sig Dispense Refill     clonazePAM (KLONOPIN) 0.5 MG tablet Take 0.5-1 tablets (0.25-0.5 mg) by mouth 2 times daily as needed for anxiety 20 tablet 1     FLUoxetine (PROZAC) 20 MG capsule Take 2 capsules (40 mg) by mouth daily 180 capsule 0     No Known Allergies      Reviewed and updated as needed this visit by Provider         Review of Systems   ROS COMP: Constitutional, HEENT, cardiovascular, pulmonary, GI, , musculoskeletal, neuro, skin, endocrine and psych systems are negative, except as otherwise noted.      Objective    Pulse 83   Temp 98.7  F (37.1  C) (Oral)   Resp 16   Ht 1.702 m (5' 7\")   Wt 74.8 kg (165 lb)   SpO2 99%   Breastfeeding? No   BMI 25.84 kg/m    Body mass index is 25.84 kg/m .  Physical Exam   GENERAL: healthy, alert and no distress  RESP: lungs clear to auscultation - no rales, rhonchi or wheezes  CV: regular rate and rhythm, normal S1 S2, no S3 or S4, no murmur, click or rub, no peripheral edema and peripheral pulses strong  MS: no gross musculoskeletal defects noted, no edema  PSYCH: mentation appears normal, affect normal/bright    Diagnostic Test Results:  Labs reviewed in Epic        Assessment & Plan     1. Anxiety  Doing fairly well, will be establishing with new provider and therapist in [place] per patient  - clonazePAM (KLONOPIN) 0.5 MG tablet; Take 0.5-1 tablets (0.25-0.5 mg) by mouth 2 times " "daily as needed for anxiety  Dispense: 20 tablet; Refill: 1    2. Adjustment disorder with mixed anxiety and depressed mood    - clonazePAM (KLONOPIN) 0.5 MG tablet; Take 0.5-1 tablets (0.25-0.5 mg) by mouth 2 times daily as needed for anxiety  Dispense: 20 tablet; Refill: 1    3. Mood disorder (H)  Stable, not to goal but patient wants to stay her 40 mg 60 made her feel like a zombie    - FLUoxetine (PROZAC) 20 MG capsule; Take 2 capsules (40 mg) by mouth daily  Dispense: 180 capsule; Refill: 0     BMI:   Estimated body mass index is 25.84 kg/m  as calculated from the following:    Height as of this encounter: 1.702 m (5' 7\").    Weight as of this encounter: 74.8 kg (165 lb).             Return in about 6 months (around 1/9/2020) for med recheck.    Brenda Larry PA-C  [facility]        "

## 2019-06-26 ENCOUNTER — PSYCHE (OUTPATIENT)
Dept: PSYCHOLOGY | Facility: CLINIC | Age: 29
End: 2019-06-26
Payer: COMMERCIAL

## 2019-06-26 DIAGNOSIS — F41.1 GENERALIZED ANXIETY DISORDER: Primary | ICD-10-CM

## 2019-06-26 PROCEDURE — 90834 PSYTX W PT 45 MINUTES: CPT | Performed by: COUNSELOR

## 2019-06-27 ENCOUNTER — OFFICE VISIT (OUTPATIENT)
Dept: OPTOMETRY | Facility: CLINIC | Age: 29
End: 2019-06-27
Payer: COMMERCIAL

## 2019-06-27 DIAGNOSIS — H52.223 REGULAR ASTIGMATISM OF BOTH EYES: Primary | ICD-10-CM

## 2019-06-27 DIAGNOSIS — H52.03 HYPEROPIA, BILATERAL: ICD-10-CM

## 2019-06-27 PROCEDURE — 92015 DETERMINE REFRACTIVE STATE: CPT | Performed by: OPTOMETRIST

## 2019-06-27 PROCEDURE — 92004 COMPRE OPH EXAM NEW PT 1/>: CPT | Performed by: OPTOMETRIST

## 2019-06-27 ASSESSMENT — TONOMETRY
IOP_METHOD: APPLANATION
OD_IOP_MMHG: 12
OS_IOP_MMHG: 12

## 2019-06-27 ASSESSMENT — KERATOMETRY
OD_K2POWER_DIOPTERS: 45.50
OD_K1POWER_DIOPTERS: 46.25
OD_AXISANGLE2_DEGREES: 6
OS_AXISANGLE2_DEGREES: 168
OS_K2POWER_DIOPTERS: 45.75
OS_K1POWER_DIOPTERS: 46.50

## 2019-06-27 ASSESSMENT — SLIT LAMP EXAM - LIDS
COMMENTS: NORMAL
COMMENTS: NORMAL

## 2019-06-27 ASSESSMENT — CONF VISUAL FIELD
OD_NORMAL: 1
METHOD: COUNTING FINGERS
OS_NORMAL: 1

## 2019-06-27 ASSESSMENT — REFRACTION_MANIFEST
OS_AXIS: 155
OD_SPHERE: -0.50
OS_SPHERE: -0.50
OS_SPHERE: -0.50
OS_CYLINDER: +1.50
OD_AXIS: 017
OD_SPHERE: -0.75
OS_AXIS: 156
METHOD_AUTOREFRACTION: 1
OS_CYLINDER: +1.50
OD_CYLINDER: +1.75
OD_CYLINDER: +1.50
OD_AXIS: 025

## 2019-06-27 ASSESSMENT — VISUAL ACUITY
OS_SC: 20/30
OS_SC+: -1
OD_SC: 20/30
METHOD: SNELLEN - LINEAR
OS_SC: 20/30
OD_SC+: -1
OD_SC: 20/30-1

## 2019-06-27 ASSESSMENT — REFRACTION_WEARINGRX
OD_SPHERE: -0.50
OD_CYLINDER: +1.25
OS_SPHERE: -0.75
OS_AXIS: 155
OD_AXIS: 025
OS_CYLINDER: +1.25

## 2019-06-27 ASSESSMENT — CUP TO DISC RATIO
OS_RATIO: 0.3
OD_RATIO: 0.3

## 2019-06-27 ASSESSMENT — EXTERNAL EXAM - RIGHT EYE: OD_EXAM: NORMAL

## 2019-06-27 ASSESSMENT — EXTERNAL EXAM - LEFT EYE: OS_EXAM: NORMAL

## 2019-06-27 NOTE — PATIENT INSTRUCTIONS
Patient was advised of today's exam findings.  Fill glasses prescription  Allow 2 weeks to adapt to change in glasses  Return in 1 year for eye exam    Janene Gallo O.D.  [facility]   [address] [place] [address] [phone]

## 2019-06-27 NOTE — PROGRESS NOTES
Chief Complaint   Patient presents with     Annual Eye Exam         Last Eye Exam: 1/14/2014  Dilated Previously: Yes    What are you currently using to see?  Glasses, has them wears them at work on computer. Leaves them at work        Distance Vision Acuity: Noticed gradual change in both eyes over the last few years     Near Vision Acuity: Thinks that there may be some slight changes.     Eye Comfort: good  Do you use eye drops? : No  Occupation or Hobbies: HR     [employer]           Medical, surgical and family histories reviewed and updated 6/27/2019.       OBJECTIVE: See Ophthalmology exam    ASSESSMENT:    ICD-10-CM    1. Regular astigmatism of both eyes H52.223 EYE EXAM (SIMPLE-NONBILLABLE)     REFRACTION   2. Hyperopia, bilateral H52.03 EYE EXAM (SIMPLE-NONBILLABLE)     REFRACTION      PLAN:     Patient Instructions   Patient was advised of today's exam findings.  Fill glasses prescription  Allow 2 weeks to adapt to change in glasses  Return in 1 year for eye exam    Janene Gallo O.D.  [facility]   [address] [place] [address]  [phone]

## 2019-07-08 NOTE — PROGRESS NOTES
Progress Note    Client Name: Charis Kruger  Date: 6/26/2019         Service Type: Individual  Video Visit: No     Session Start Time: 8:05am  Session End Time: 8:55am     Session Length: 50 minutes    Session #: 12    Attendees: Client attended alone     Treatment Plan Last Reviewed:   PHQ-9 / NICOLASA-7 :     DATA  Interactive Complexity: No  Crisis: No       Progress Since Last Session (Related to Symptoms / Goals / Homework):   Symptoms: Improving  is starting to accept that she is leaving and they are . Feeling more refreshed and renewed, and confirming of her decision.    Homework: Achieved / completed to satisfaction      Episode of Care Goals: Satisfactory progress - ACTION (Actively working towards change); Intervened by reinforcing change plan / affirming steps taken     Current / Ongoing Stressors and Concerns:  Planning on moving to [place] in July. She and  are , which is causing drama amongst peer group.  wants to help her move to [place] and she is allowing him to. Attended one couple's therapy session which she found interesting, but still planning on moving forward with separation.     Treatment Objective(s) Addressed in This Session:   identify 2 stressors which contribute to feelings of anxiety  learn & utilize at least 2 assertive communication skills weekly     Intervention:   Interpersonal Therapy: Processing her interactions at the couple's therapy appointment and what the therapist had to say. Practicing healthy communication with  and setting limits and expectations. Explored how she is going to communicate limitations/boundaries while he is helping her move, and what she wants their last interactions in [place] to look like, how she can encourage those things to happen, while also respecting some of his boundaries.    Motivational Interviewing    MI Intervention: Expressed Empathy/Understanding,  Supported Autonomy, Collaboration, Evocation, Open-ended questions and Change talk (evoked)     Change Talk Expressed by the Patient: Committment to change Activation Taking steps    Provider Response to Change Talk: E - Evoked more info from patient about behavior change, A - Affirmed patient's thoughts, decisions, or attempts at behavior change, R - Reflected patient's change talk and S - Summarized patient's change talk statements          ASSESSMENT: Current Emotional / Mental Status (status of significant symptoms):   Risk status (Self / Other harm or suicidal ideation)   Client denies current fears or concerns for personal safety.   Client denies current or recent suicidal ideation or behaviors.   Client denies current or recent homicidal ideation or behaviors.   Client denies current or recent self injurious behavior or ideation.   Client denies other safety concerns.   Client Client reports there has been no change in risk factors since their last session.     Client Client reports there has been no change in protective factors since their last session.     A safety and risk management plan has not been developed at this time, however client was given the after-hours number / 911 should there be a change in any of these risk factors.     Appearance:   Appropriate    Eye Contact:   Good    Psychomotor Behavior: Normal    Attitude:   Cooperative    Orientation:   All   Speech    Rate / Production: Normal     Volume:  Normal    Mood:    Normal   Affect:    Appropriate    Thought Content:  Clear    Thought Form:  Coherent  Goal Directed  Logical    Insight:    Good      Medication Review:   No changes to current psychiatric medication(s)     Medication Compliance:   Yes     Changes in Health Issues:   None reported     Chemical Use Review:   Substance Use: Chemical use reviewed, no active concerns identified      Tobacco Use: No current tobacco use.      Diagnosis:  1. Generalized anxiety disorder         Collateral Reports Completed:   Not Applicable    PLAN: (Client Tasks / Therapist Tasks / Other)  Continue with individual therapy. Prepare for transition and move to [place]. Therapist will bring referrals to next appointment for [place].      Natividad Zarate, [facility]                                                      ________________________________________________________________________    Treatment Plan    Client's Name: Charis Kruger  YOB: 1990    Date: 2/5/2019    DSM-V Diagnoses: 300.02 (F41.1) Generalized Anxiety Disorder  Psychosocial / Contextual Factors: marital discord  WHODAS: 35    Referral / Collaboration:  Referral to another professional/service is not indicated at this time..    Anticipated number of session or this episode of care: 18-20      MeasurableTreatment Goal(s) related to diagnosis / functional impairment(s)  Goal 1: Client will evaluate pros and cons of staying in current marriage.    Objective #A (Client Action)    Client will assess gains and losses from leaving marriage.  Status: New - Date: 2/5/2019     Intervention(s)  Therapist will teach DBT skills pros and cons.    Objective #B  Client will assess gains and losses from staying in marriage.  Status: New - Date: 2/5/2019     Intervention(s)  Therapist will teach DBT pros and cons.    Goal 2: Client will explore anxiety symptoms related to change.    Objective #A (Client Action)    Status: New - Date: 2/5/2019     Client will use cognitive strategies identified in therapy to challenge anxious thoughts.    Intervention(s)  Therapist will teach thought stopping strategies.    Objective #B  Client will learn how thoughts, actions, and feelings are related to decision making.    Status: New - Date: 2/5/2019     Intervention(s)  Therapist will teach behavioral chaining.    Goal 3: Client will gain confidence in her decision making skills.    Objective #A (Client Action)    Status: New - Date: 2/5/2019     Client  will identify what causes her lack of confidence in executing decisions.    Intervention(s)  Therapist will assign homework to improve assertiveness.    Objective #B  Client will learn & utilize at least 3 assertive communication skills weekly.    Status: New - Date: 2/5/2019     Intervention(s)  Therapist will role-play effective communication skills.    Objective #C  Client will improve overall confidence and self-assurance.  Status: New - Date: 2/5/2019     Intervention(s)  Therapist will role-play assertiveness skills.      Client has not reviewed nor agreed to the above plan.      Natividad Zarate, LPC  June 21, 2019

## 2019-07-09 ENCOUNTER — OFFICE VISIT (OUTPATIENT)
Dept: FAMILY MEDICINE | Facility: CLINIC | Age: 29
End: 2019-07-09
Payer: COMMERCIAL

## 2019-07-09 VITALS
WEIGHT: 165 LBS | HEIGHT: 67 IN | TEMPERATURE: 98.7 F | SYSTOLIC BLOOD PRESSURE: 98 MMHG | HEART RATE: 83 BPM | RESPIRATION RATE: 16 BRPM | BODY MASS INDEX: 25.9 KG/M2 | DIASTOLIC BLOOD PRESSURE: 60 MMHG | OXYGEN SATURATION: 99 %

## 2019-07-09 DIAGNOSIS — F41.9 ANXIETY: ICD-10-CM

## 2019-07-09 DIAGNOSIS — F39 MOOD DISORDER (H): ICD-10-CM

## 2019-07-09 DIAGNOSIS — F43.23 ADJUSTMENT DISORDER WITH MIXED ANXIETY AND DEPRESSED MOOD: ICD-10-CM

## 2019-07-09 PROCEDURE — 99214 OFFICE O/P EST MOD 30 MIN: CPT | Performed by: PHYSICIAN ASSISTANT

## 2019-07-09 RX ORDER — CLONAZEPAM 0.5 MG/1
0.25-0.5 TABLET ORAL 2 TIMES DAILY PRN
Qty: 20 TABLET | Refills: 1 | Status: SHIPPED | OUTPATIENT
Start: 2019-07-09 | End: 2021-01-12

## 2019-07-09 ASSESSMENT — ANXIETY QUESTIONNAIRES
6. BECOMING EASILY ANNOYED OR IRRITABLE: MORE THAN HALF THE DAYS
GAD7 TOTAL SCORE: 8
2. NOT BEING ABLE TO STOP OR CONTROL WORRYING: SEVERAL DAYS
1. FEELING NERVOUS, ANXIOUS, OR ON EDGE: SEVERAL DAYS
IF YOU CHECKED OFF ANY PROBLEMS ON THIS QUESTIONNAIRE, HOW DIFFICULT HAVE THESE PROBLEMS MADE IT FOR YOU TO DO YOUR WORK, TAKE CARE OF THINGS AT HOME, OR GET ALONG WITH OTHER PEOPLE: SOMEWHAT DIFFICULT
5. BEING SO RESTLESS THAT IT IS HARD TO SIT STILL: SEVERAL DAYS
3. WORRYING TOO MUCH ABOUT DIFFERENT THINGS: MORE THAN HALF THE DAYS
7. FEELING AFRAID AS IF SOMETHING AWFUL MIGHT HAPPEN: NOT AT ALL

## 2019-07-09 ASSESSMENT — PATIENT HEALTH QUESTIONNAIRE - PHQ9
5. POOR APPETITE OR OVEREATING: SEVERAL DAYS
SUM OF ALL RESPONSES TO PHQ QUESTIONS 1-9: 9

## 2019-07-09 ASSESSMENT — MIFFLIN-ST. JEOR: SCORE: 1516.07

## 2019-07-10 ASSESSMENT — ANXIETY QUESTIONNAIRES: GAD7 TOTAL SCORE: 8

## 2019-07-11 ENCOUNTER — PSYCHE (OUTPATIENT)
Dept: PSYCHOLOGY | Facility: CLINIC | Age: 29
End: 2019-07-11
Payer: COMMERCIAL

## 2019-07-11 DIAGNOSIS — F41.1 GENERALIZED ANXIETY DISORDER: Primary | ICD-10-CM

## 2019-07-11 PROCEDURE — 90834 PSYTX W PT 45 MINUTES: CPT | Performed by: COUNSELOR

## 2019-07-17 ENCOUNTER — PSYCHE (OUTPATIENT)
Dept: PSYCHOLOGY | Facility: CLINIC | Age: 29
End: 2019-07-17
Payer: COMMERCIAL

## 2019-07-17 DIAGNOSIS — F41.1 GENERALIZED ANXIETY DISORDER: Primary | ICD-10-CM

## 2019-07-17 PROCEDURE — 90834 PSYTX W PT 45 MINUTES: CPT | Performed by: COUNSELOR

## 2019-07-18 NOTE — PROGRESS NOTES
Progress Note    Client Name: Charis Kruger  Date: 7/11/2019         Service Type: Individual  Video Visit: No     Session Start Time: 5:00pm  Session End Time: 5:50pm     Session Length: 50 minutes    Session #: 13    Attendees: Client attended alone     Treatment Plan Last Reviewed:   PHQ-9 / NICOLASA-7 :     DATA  Interactive Complexity: No  Crisis: No       Progress Since Last Session (Related to Symptoms / Goals / Homework):   Symptoms: Improving  is starting to accept that she is leaving and they are . Feeling more refreshed and renewed, and confirming of her decision.    Homework: Achieved / completed to satisfaction      Episode of Care Goals: Satisfactory progress - ACTION (Actively working towards change); Intervened by reinforcing change plan / affirming steps taken     Current / Ongoing Stressors and Concerns:  Planning on moving to [place] in a few weeks. She and  are , which is causing drama amongst peer group.  wants to help her move to [place] and she is allowing him to. Attended one couple's therapy session which she found interesting, but still planning on moving forward with separation.     Treatment Objective(s) Addressed in This Session:   participate in 2 activities to improve mood  learn & utilize at least 2 assertive communication skills weekly     Intervention:   Interpersonal Therapy: Identifying ways to improve her overall mood and health, as well as looking into options when she moved to [place] so that she can keep her mind occupied and explore/rediscover herself. Thinking through healthy self-care. Also identified boundaries and communication to set with  prior to the trip so that there is no confusion about what the trip means for them.    Motivational Interviewing    MI Intervention: Expressed Empathy/Understanding, Supported Autonomy, Collaboration, Evocation, Open-ended questions and  Change talk (evoked)     Change Talk Expressed by the Patient: Committment to change Activation Taking steps    Provider Response to Change Talk: E - Evoked more info from patient about behavior change, A - Affirmed patient's thoughts, decisions, or attempts at behavior change, R - Reflected patient's change talk and S - Summarized patient's change talk statements          ASSESSMENT: Current Emotional / Mental Status (status of significant symptoms):   Risk status (Self / Other harm or suicidal ideation)   Client denies current fears or concerns for personal safety.   Client denies current or recent suicidal ideation or behaviors.   Client denies current or recent homicidal ideation or behaviors.   Client denies current or recent self injurious behavior or ideation.   Client denies other safety concerns.   Client Client reports there has been no change in risk factors since their last session.     Client Client reports there has been no change in protective factors since their last session.     A safety and risk management plan has not been developed at this time, however client was given the after-hours number / 911 should there be a change in any of these risk factors.     Appearance:   Appropriate    Eye Contact:   Good    Psychomotor Behavior: Normal    Attitude:   Cooperative    Orientation:   All   Speech    Rate / Production: Normal     Volume:  Normal    Mood:    Normal   Affect:    Appropriate    Thought Content:  Clear    Thought Form:  Coherent  Goal Directed  Logical    Insight:    Good      Medication Review:   No changes to current psychiatric medication(s)     Medication Compliance:   Yes     Changes in Health Issues:   None reported     Chemical Use Review:   Substance Use: Chemical use reviewed, no active concerns identified      Tobacco Use: No current tobacco use.      Diagnosis:  1. Generalized anxiety disorder        Collateral Reports Completed:   Not Applicable    PLAN: (Client Tasks /  Therapist Tasks / Other)  Continue with individual therapy. Prepare for transition and move to [place]. Explore boundaries with  moving forward    Natividad Zarate, LPC                                                      ________________________________________________________________________    Treatment Plan    Client's Name: Charis Kruger  YOB: 1990    Date:  7/11/2019     DSM-V Diagnoses: 300.02 (F41.1) Generalized Anxiety Disorder  Psychosocial / Contextual Factors: marital discord  WHODAS: 35    Referral / Collaboration:  Referral to another professional/service is not indicated at this time..    Anticipated number of session or this episode of care: 18-20      MeasurableTreatment Goal(s) related to diagnosis / functional impairment(s)  Goal 1: Client will evaluate pros and cons of staying in current marriage.    Objective #A (Client Action)    Client will assess gains and losses from leaving marriage.  Status: Continued - Date(s):  7/11/2019     Intervention(s)  Therapist will teach DBT skills pros and cons.    Objective #B  Client will assess gains and losses from staying in marriage.  Status: Continued - Date(s):  7/11/2019     Intervention(s)  Therapist will teach DBT pros and cons.    Goal 2: Client will explore anxiety symptoms related to change.    Objective #A (Client Action)    Status: Continued - Date(s):  7/11/2019      Client will use cognitive strategies identified in therapy to challenge anxious thoughts.    Intervention(s)  Therapist will teach thought stopping strategies.    Objective #B  Client will learn how thoughts, actions, and feelings are related to decision making.    Status: Continued - Date(s):  7/11/2019     Intervention(s)  Therapist will teach behavioral chaining.    Goal 3: Client will gain confidence in her decision making skills.    Objective #A (Client Action)    Status: Continued - Date(s):  7/11/2019      Client will identify what causes her lack  of confidence in executing decisions.    Intervention(s)  Therapist will assign homework to improve assertiveness.    Objective #B  Client will learn & utilize at least 3 assertive communication skills weekly.    Status: Continued - Date(s):  7/11/2019     Intervention(s)  Therapist will role-play effective communication skills.    Objective #C  Client will improve overall confidence and self-assurance.  Status: Continued - Date(s):  7/11/2019     Intervention(s)  Therapist will role-play assertiveness skills.      Client has not reviewed nor agreed to the above plan.      Natividad Zarate, LPC  July 15, 2019

## 2019-07-19 ENCOUNTER — TELEPHONE (OUTPATIENT)
Dept: FAMILY MEDICINE | Facility: CLINIC | Age: 29
End: 2019-07-19

## 2019-07-19 NOTE — TELEPHONE ENCOUNTER
Attempted to reach pt. There was no answer. LM to return call to RN at [phone].  Savanna Pulido, MARIAMN, RN

## 2019-07-19 NOTE — TELEPHONE ENCOUNTER
Pt returned call to RN. RN reviewed documentation per Dr. Garcia below with pt.    Pt states she is just hoping to get the lost Rx replaced before she moves permanently out of state on Wednesday 7/24/19.  Pt states she will establish care with a new provider out of state, but was hoping to get the clonazepam filled before moving in case it take awhile to establish care out of state.    Routed to PCP to review and advise.    MARIAM ShaferN, RN

## 2019-07-19 NOTE — TELEPHONE ENCOUNTER
Patient states she lost the prescription for her clonazepam and would like a new one.  Please call when ready to pick to  at [facility] pharmacy.    Thank you.

## 2019-07-19 NOTE — TELEPHONE ENCOUNTER
Per review of , the 7/9/19 order has not been filled.  Routing to provider to review and advise regarding pt report of lost clonazepam order.    clonazePAM (KLONOPIN) 0.5 MG tablet 20 tablet 1 7/9/2019  No   Sig - Route: Take 0.5-1 tablets (0.25-0.5 mg) by mouth 2 times daily as needed for anxiety - Oral   Class: Local Print     Savanna Pulido, MARIAMN, RN

## 2019-07-22 NOTE — TELEPHONE ENCOUNTER
Patient informed of provider note as below  Patient verbalized understanding    Kasandra Kwan BSN, RN, CPN

## 2019-07-22 NOTE — TELEPHONE ENCOUNTER
Left message on answering machine for patient to call back.  [phone]  Kasandra BECERRAN, RN, CPN

## 2019-07-22 NOTE — TELEPHONE ENCOUNTER
I do not replace lost Rx of controlled substances.  I am sorry but the rules are strict for this.   If it was stolen and they bring in a police report, we can do a replacement one time in that instance     Brenda

## 2019-07-26 NOTE — PROGRESS NOTES
Progress Note    Client Name: Charis Kruger  Date: 7/17/2019         Service Type: Individual  Video Visit: No     Session Start Time: 8:00am  Session End Time: 8:50am     Session Length: 50 minutes    Session #: 14    Attendees: Client attended alone     Treatment Plan Last Reviewed:   PHQ-9 / NICOLASA-7 :     DATA  Interactive Complexity: No  Crisis: No       Progress Since Last Session (Related to Symptoms / Goals / Homework):   Symptoms: Improving  is starting to accept that she is leaving and they are . Feeling more refreshed and renewed, and confirming of her decision.    Homework: Achieved / completed to satisfaction      Episode of Care Goals: Satisfactory progress - ACTION (Actively working towards change); Intervened by reinforcing change plan / affirming steps taken     Current / Ongoing Stressors and Concerns:  Planning on moving to [place] this week. She and  are  and trying to figure out what comes next, which is causing drama amongst peer group.  will not be moving to [place] with her at this time     Treatment Objective(s) Addressed in This Session:   participate in 2 activities to improve mood  learn & utilize at least 2 assertive communication skills weekly     Intervention:   Interpersonal Therapy: Setting limits and boundaries with .  would like to have weekly communication with charis. She wanted to explore and brainstorm different topics. Therapist also encouraged her to figure out what limits she has around conversations and what conversations are off limits versus acceptable, as well as how to disengage from the conversation if it goes mary.     Motivational Interviewing    MI Intervention: Expressed Empathy/Understanding, Supported Autonomy, Collaboration, Evocation, Open-ended questions and Change talk (evoked)     Change Talk Expressed by the Patient: Committment to change Activation Taking  steps    Provider Response to Change Talk: E - Evoked more info from patient about behavior change, A - Affirmed patient's thoughts, decisions, or attempts at behavior change, R - Reflected patient's change talk and S - Summarized patient's change talk statements          ASSESSMENT: Current Emotional / Mental Status (status of significant symptoms):   Risk status (Self / Other harm or suicidal ideation)   Client denies current fears or concerns for personal safety.   Client denies current or recent suicidal ideation or behaviors.   Client denies current or recent homicidal ideation or behaviors.   Client denies current or recent self injurious behavior or ideation.   Client denies other safety concerns.   Client Client reports there has been no change in risk factors since their last session.     Client Client reports there has been no change in protective factors since their last session.     A safety and risk management plan has not been developed at this time, however client was given the after-hours number / 911 should there be a change in any of these risk factors.     Appearance:   Appropriate    Eye Contact:   Good    Psychomotor Behavior: Normal    Attitude:   Cooperative    Orientation:   All   Speech    Rate / Production: Normal     Volume:  Normal    Mood:    Normal   Affect:    Appropriate    Thought Content:  Clear    Thought Form:  Coherent  Goal Directed  Logical    Insight:    Good      Medication Review:   No changes to current psychiatric medication(s)     Medication Compliance:   Yes     Changes in Health Issues:   None reported     Chemical Use Review:   Substance Use: Chemical use reviewed, no active concerns identified      Tobacco Use: No current tobacco use.      Diagnosis:  1. Generalized anxiety disorder        Collateral Reports Completed:   Not Applicable    PLAN: (Client Tasks / Therapist Tasks / Other)  Discharge from [place]. Will reach out to therapists when settled in  [place].    Natividadcamden Zarate, LPC                                                      ________________________________________________________________________    Treatment Plan    Client's Name: Charis Kruger  YOB: 1990    Date:  7/11/2019     DSM-V Diagnoses: 300.02 (F41.1) Generalized Anxiety Disorder  Psychosocial / Contextual Factors: marital discord  WHODAS: 35    Referral / Collaboration:  Referral to another professional/service is not indicated at this time..    Anticipated number of session or this episode of care: 18-20      MeasurableTreatment Goal(s) related to diagnosis / functional impairment(s)  Goal 1: Client will evaluate pros and cons of staying in current marriage.    Objective #A (Client Action)    Client will assess gains and losses from leaving marriage.  Status: Continued - Date(s):  7/11/2019     Intervention(s)  Therapist will teach DBT skills pros and cons.    Objective #B  Client will assess gains and losses from staying in marriage.  Status: Continued - Date(s):  7/11/2019     Intervention(s)  Therapist will teach DBT pros and cons.    Goal 2: Client will explore anxiety symptoms related to change.    Objective #A (Client Action)    Status: Continued - Date(s):  7/11/2019      Client will use cognitive strategies identified in therapy to challenge anxious thoughts.    Intervention(s)  Therapist will teach thought stopping strategies.    Objective #B  Client will learn how thoughts, actions, and feelings are related to decision making.    Status: Continued - Date(s):  7/11/2019     Intervention(s)  Therapist will teach behavioral chaining.    Goal 3: Client will gain confidence in her decision making skills.    Objective #A (Client Action)    Status: Continued - Date(s):  7/11/2019      Client will identify what causes her lack of confidence in executing decisions.    Intervention(s)  Therapist will assign homework to improve assertiveness.    Objective #B  Client will  learn & utilize at least 3 assertive communication skills weekly.    Status: Continued - Date(s):  7/11/2019     Intervention(s)  Therapist will role-play effective communication skills.    Objective #C  Client will improve overall confidence and self-assurance.  Status: Continued - Date(s):  7/11/2019     Intervention(s)  Therapist will role-play assertiveness skills.      Client has not reviewed nor agreed to the above plan.      Natividad Zarate, RICHMOND  July 25, 2019

## 2019-09-01 ENCOUNTER — TRANSFERRED RECORDS (OUTPATIENT)
Dept: HEALTH INFORMATION MANAGEMENT | Facility: CLINIC | Age: 29
End: 2019-09-01

## 2019-09-01 LAB — PAP SMEAR - HIM PATIENT REPORTED: NEGATIVE

## 2019-09-30 ENCOUNTER — MYC MEDICAL ADVICE (OUTPATIENT)
Dept: FAMILY MEDICINE | Facility: CLINIC | Age: 29
End: 2019-09-30

## 2019-09-30 NOTE — LETTER
[facility]  [address] [facility] [address]  Phone: [phone]    October 1, 2019        Charis Kruger  [address] [place] [address]          To whom it may concern:    RE: Charis Foxdelroy Kruger    I last saw patient for treatment of depression on 7-9-19. She was doing fair. She has been compliant with treatment. I think she could go on Accutane.     Please contact me for questions or concerns.      Sincerely,        Brenda Larry PA-C

## 2019-10-02 ENCOUNTER — TELEPHONE (OUTPATIENT)
Dept: BEHAVIORAL HEALTH | Facility: CLINIC | Age: 29
End: 2019-10-02

## 2019-10-03 NOTE — TELEPHONE ENCOUNTER
Clinic Action Needed:Yes  Reason for Call: Patient reports that she was given a list of referrals for therapists in [place], but had lost the list.  She wonders if these names could be emailed to her or if the provider could contact her with the list of names.  Patient may be reached at [phone]     Routed to: RICHMOND Gibson, RN  [facility]

## 2020-03-10 ENCOUNTER — HEALTH MAINTENANCE LETTER (OUTPATIENT)
Age: 30
End: 2020-03-10

## 2020-12-27 ENCOUNTER — HEALTH MAINTENANCE LETTER (OUTPATIENT)
Age: 30
End: 2020-12-27

## 2021-01-06 ENCOUNTER — TELEPHONE (OUTPATIENT)
Dept: FAMILY MEDICINE | Facility: CLINIC | Age: 31
End: 2021-01-06

## 2021-01-06 DIAGNOSIS — F39 MOOD DISORDER (H): ICD-10-CM

## 2021-01-06 NOTE — TELEPHONE ENCOUNTER
Patient called, she has moved out of state, but is back for a little while. She is wondering if her fluoxetine can get refilled.Nanette Hua MA/TC

## 2021-01-07 NOTE — TELEPHONE ENCOUNTER
Left message on patient's voicemail to call back and set up a video visit appointment. Thank you.Nanette Hua MA/ANA MARIA

## 2021-01-08 NOTE — PROGRESS NOTES
Charis is a 30 year old who is being evaluated via a billable VIDEO visit.      What phone number would you like to be contacted at? [phone]  How would you like to obtain your AVS? MyChart  Assessment & Plan         Mood disorder (H)  Improved, doing well  Recheck 6 months in person otherwise establish in [place]      - FLUoxetine (PROZAC) 20 MG capsule; Take 1 capsule (20 mg) by mouth daily      Return in about 6 months (around 7/12/2021) for med recheck.    Brenda Larry PA-C  [facility]     Charis is a 30 year old who presents to clinic today for the following health issues : prozac refill    HPI       Depression Followup    How are you doing with your depression since your last visit? Stable    Are you having other symptoms that might be associated with depression? No    Have you had a significant life event?  No     Are you feeling anxious or having panic attacks?   No    Do you have any concerns with your use of alcohol or other drugs? No      Has been on prozac 20 mg working well, before that was taking 40 mg. 60 made her feel like a zombie.   Clonopin-- no longer needs this. Doing well.       Due for pap---had in [place] was normal done 2019.  Normal.     Denies suicidal or homicidal thoughts.  Patient instructed to go to the emergency room or call 911 if these occur.     living in [place] but back home in [place] for a while now visiting her parents.   Before that was living in [place].         Social History     Tobacco Use     Smoking status: Never Smoker     Smokeless tobacco: Never Used   Substance Use Topics     Alcohol use: Yes     Comment: socially     Drug use: No     PHQ 1/28/2019 6/17/2019 7/9/2019   PHQ-9 Total Score 2 13 9   Q9: Thoughts of better off dead/self-harm past 2 weeks Not at all Not at all Not at all   F/U: Thoughts of suicide or self-harm - - -   F/U: Safety concerns - - -     NICOLASA-7 SCORE 1/28/2019 6/17/2019 7/9/2019    Total Score - 13 (moderate anxiety) -   Total Score 3 13 8     Last PHQ-9 1/12/2021   1.  Little interest or pleasure in doing things 1   2.  Feeling down, depressed, or hopeless 1   3.  Trouble falling or staying asleep, or sleeping too much 1   4.  Feeling tired or having little energy 1   5.  Poor appetite or overeating 1   6.  Feeling bad about yourself 0   7.  Trouble concentrating 0   8.  Moving slowly or restless 1   Q9: Thoughts of better off dead/self-harm past 2 weeks 0   PHQ-9 Total Score 6   Difficulty at work, home, or with people Somewhat difficult   In the past two weeks have you had thoughts of suicide or self harm? -   Do you have concerns about your personal safety or the safety of others? -     NICOLASA-7  1/12/2021   1. Feeling nervous, anxious, or on edge 1   2. Not being able to stop or control worrying 1   3. Worrying too much about different things 1   4. Trouble relaxing 0   5. Being so restless that it is hard to sit still 0   6. Becoming easily annoyed or irritable 1   7. Feeling afraid, as if something awful might happen 1   NICOLASA-7 Total Score 5   If you checked any problems, how difficult have they made it for you to do your work, take care of things at home, or get along with other people? Somewhat difficult         Suicide Assessment Five-step Evaluation and Treatment (SAFE-T)      How many servings of fruits and vegetables do you eat daily?  2-3    On average, how many sweetened beverages do you drink each day (Examples: soda, juice, sweet tea, etc.  Do NOT count diet or artificially sweetened beverages)?   0    How many days per week do you miss taking your medication? 0    Review of Systems   Constitutional, HEENT, cardiovascular, pulmonary, GI, , musculoskeletal, neuro, skin, endocrine and psych systems are negative, except as otherwise noted.      Objective           Vitals:  No vitals were obtained today due to virtual visit.    Physical Exam   healthy, alert and no  distress  PSYCH: Alert and oriented times 3; coherent speech, normal   rate and volume, able to articulate logical thoughts, able   to abstract reason, no tangential thoughts, no hallucinations   or delusions  Her affect is normal  RESP: No cough, no audible wheezing, able to talk in full sentences  Remainder of exam unable to be completed due to telephone visits                Video start time: 3 pm  Video end time: 3:07 pm  Platform: Good People

## 2021-01-11 ENCOUNTER — TELEPHONE (OUTPATIENT)
Dept: FAMILY MEDICINE | Facility: CLINIC | Age: 31
End: 2021-01-11

## 2021-01-11 NOTE — TELEPHONE ENCOUNTER
Please call patient, I have not seen her since 2019. I require an in person or VIDEO appointment for a medication refill. Please have her change as needed, she is down for telephone appointment.     Brenda Larry PA-C

## 2021-01-11 NOTE — TELEPHONE ENCOUNTER
Left message for patient to call back, to change to a video or in-person visit. No telephone appointment per Brenda Larry.Nanette Hua MA/TC

## 2021-01-12 ENCOUNTER — VIRTUAL VISIT (OUTPATIENT)
Dept: FAMILY MEDICINE | Facility: CLINIC | Age: 31
End: 2021-01-12
Payer: COMMERCIAL

## 2021-01-12 DIAGNOSIS — F39 MOOD DISORDER (H): ICD-10-CM

## 2021-01-12 DIAGNOSIS — Z11.4 SCREENING FOR HIV (HUMAN IMMUNODEFICIENCY VIRUS): ICD-10-CM

## 2021-01-12 DIAGNOSIS — Z11.59 NEED FOR HEPATITIS C SCREENING TEST: ICD-10-CM

## 2021-01-12 PROCEDURE — 99213 OFFICE O/P EST LOW 20 MIN: CPT | Mod: 95 | Performed by: PHYSICIAN ASSISTANT

## 2021-01-12 ASSESSMENT — PATIENT HEALTH QUESTIONNAIRE - PHQ9
5. POOR APPETITE OR OVEREATING: NOT AT ALL
SUM OF ALL RESPONSES TO PHQ QUESTIONS 1-9: 6

## 2021-01-12 ASSESSMENT — ANXIETY QUESTIONNAIRES
7. FEELING AFRAID AS IF SOMETHING AWFUL MIGHT HAPPEN: SEVERAL DAYS
GAD7 TOTAL SCORE: 5
5. BEING SO RESTLESS THAT IT IS HARD TO SIT STILL: NOT AT ALL
2. NOT BEING ABLE TO STOP OR CONTROL WORRYING: SEVERAL DAYS
1. FEELING NERVOUS, ANXIOUS, OR ON EDGE: SEVERAL DAYS
IF YOU CHECKED OFF ANY PROBLEMS ON THIS QUESTIONNAIRE, HOW DIFFICULT HAVE THESE PROBLEMS MADE IT FOR YOU TO DO YOUR WORK, TAKE CARE OF THINGS AT HOME, OR GET ALONG WITH OTHER PEOPLE: SOMEWHAT DIFFICULT
3. WORRYING TOO MUCH ABOUT DIFFERENT THINGS: SEVERAL DAYS
6. BECOMING EASILY ANNOYED OR IRRITABLE: SEVERAL DAYS

## 2021-01-12 NOTE — Clinical Note
Please abstract the following data from this visit with this patient into the appropriate field in Epic:    Tests that can be patient reported without a hard copy:        Other Tests found in the patient's chart through Chart Review/Care Everywhere:    Pap smear done by this group group in [place] on this date: 9-1-2019 normal pap    Note to Abstraction: If this section is blank, no results were found via Chart Review/Care Everywhere.

## 2021-01-13 ASSESSMENT — ANXIETY QUESTIONNAIRES: GAD7 TOTAL SCORE: 5

## 2021-04-24 ENCOUNTER — HEALTH MAINTENANCE LETTER (OUTPATIENT)
Age: 31
End: 2021-04-24

## 2021-08-04 DIAGNOSIS — F39 MOOD DISORDER (H): ICD-10-CM

## 2021-10-09 ENCOUNTER — HEALTH MAINTENANCE LETTER (OUTPATIENT)
Age: 31
End: 2021-10-09

## 2022-05-16 ENCOUNTER — HEALTH MAINTENANCE LETTER (OUTPATIENT)
Age: 32
End: 2022-05-16

## 2022-09-11 ENCOUNTER — HEALTH MAINTENANCE LETTER (OUTPATIENT)
Age: 32
End: 2022-09-11

## 2023-06-03 ENCOUNTER — HEALTH MAINTENANCE LETTER (OUTPATIENT)
Age: 33
End: 2023-06-03